# Patient Record
Sex: MALE | Race: WHITE | NOT HISPANIC OR LATINO | Employment: OTHER | URBAN - METROPOLITAN AREA
[De-identification: names, ages, dates, MRNs, and addresses within clinical notes are randomized per-mention and may not be internally consistent; named-entity substitution may affect disease eponyms.]

---

## 2018-02-20 ENCOUNTER — OFFICE VISIT (OUTPATIENT)
Dept: FAMILY MEDICINE CLINIC | Facility: CLINIC | Age: 63
End: 2018-02-20
Payer: COMMERCIAL

## 2018-02-20 VITALS
WEIGHT: 233.4 LBS | HEIGHT: 77 IN | DIASTOLIC BLOOD PRESSURE: 80 MMHG | HEART RATE: 68 BPM | BODY MASS INDEX: 27.56 KG/M2 | SYSTOLIC BLOOD PRESSURE: 150 MMHG

## 2018-02-20 DIAGNOSIS — G43.111 INTRACTABLE MIGRAINE WITH AURA WITH STATUS MIGRAINOSUS: ICD-10-CM

## 2018-02-20 DIAGNOSIS — G43.109 MIGRAINE WITH AURA AND WITHOUT STATUS MIGRAINOSUS, NOT INTRACTABLE: Primary | ICD-10-CM

## 2018-02-20 PROCEDURE — 3008F BODY MASS INDEX DOCD: CPT | Performed by: PHYSICIAN ASSISTANT

## 2018-02-20 PROCEDURE — 99203 OFFICE O/P NEW LOW 30 MIN: CPT | Performed by: PHYSICIAN ASSISTANT

## 2018-02-20 RX ORDER — METHOCARBAMOL 500 MG/1
500 TABLET, FILM COATED ORAL
COMMUNITY
Start: 2016-10-24 | End: 2020-11-17 | Stop reason: ALTCHOICE

## 2018-02-20 RX ORDER — ACETAMINOPHEN AND CODEINE PHOSPHATE 300; 30 MG/1; MG/1
1 TABLET ORAL EVERY 4 HOURS PRN
Qty: 30 TABLET | Refills: 0 | Status: SHIPPED | OUTPATIENT
Start: 2018-02-20 | End: 2018-03-22

## 2018-02-20 RX ORDER — ALPRAZOLAM 0.5 MG/1
0.5 TABLET ORAL EVERY 8 HOURS PRN
Refills: 5 | COMMUNITY
Start: 2018-01-09

## 2018-02-20 RX ORDER — LAMOTRIGINE 100 MG/1
100 TABLET ORAL DAILY
COMMUNITY
Start: 2007-11-05

## 2018-02-20 RX ORDER — AMITRIPTYLINE HYDROCHLORIDE 25 MG/1
25 TABLET, FILM COATED ORAL DAILY
COMMUNITY
Start: 2017-11-28

## 2018-02-20 RX ORDER — RAMIPRIL 10 MG/1
CAPSULE ORAL
COMMUNITY
Start: 2017-11-28

## 2018-02-20 RX ORDER — METOPROLOL SUCCINATE 50 MG/1
50 TABLET, EXTENDED RELEASE ORAL
COMMUNITY
Start: 2007-11-05

## 2018-02-20 RX ORDER — BUPROPION HYDROCHLORIDE 200 MG/1
200 TABLET, EXTENDED RELEASE ORAL 2 TIMES DAILY
COMMUNITY
Start: 2016-07-30

## 2018-02-20 RX ORDER — RISPERIDONE 0.5 MG/1
0.5 TABLET, FILM COATED ORAL
COMMUNITY
Start: 2007-11-05

## 2018-02-20 RX ORDER — PAROXETINE 10 MG/1
TABLET, FILM COATED ORAL
COMMUNITY
Start: 2017-11-28

## 2018-02-20 RX ORDER — VERAPAMIL HYDROCHLORIDE 240 MG/1
TABLET, FILM COATED, EXTENDED RELEASE ORAL
COMMUNITY
Start: 2017-11-28 | End: 2020-09-06 | Stop reason: HOSPADM

## 2018-02-20 RX ORDER — TADALAFIL 5 MG/1
TABLET ORAL
Status: ON HOLD | COMMUNITY
Start: 2016-08-16 | End: 2020-09-05 | Stop reason: CLARIF

## 2018-02-20 NOTE — ASSESSMENT & PLAN NOTE
Patient was informed that we do not usually prescribe all the medications he is currently taking for his bipolar disorder and that he may have to see a psychiatrist in the future  He is currently on Cobra so we may need to prescribe refills until he gets full insurance again  Currently does not need refills  He states he uses Xanax 0 5 twice daily regularly in addition to all of his other medications

## 2018-02-20 NOTE — PATIENT INSTRUCTIONS
Problem List Items Addressed This Visit     Intractable migraine with aura with status migrainosus - Primary     Tylenol #3 given today  Toradol declined  Failed all other medications in the past   [place] pain web site checked and acceptable for only Xanax use from his last family provider           Relevant Medications    aspirin 81 MG tablet    amitriptyline (ELAVIL) 25 mg tablet    buPROPion (WELLBUTRIN SR) 200 MG 12 hr tablet    lamoTRIgine (LaMICtal) 100 mg tablet    methocarbamol (ROBAXIN) 500 mg tablet    metoprolol succinate (TOPROL-XL) 50 mg 24 hr tablet    PARoxetine (PAXIL) 10 mg tablet    verapamil (CALAN-SR) 240 mg CR tablet    acetaminophen-codeine (TYLENOL #3) 300-30 mg per tablet

## 2018-02-20 NOTE — ASSESSMENT & PLAN NOTE
Tylenol #3 given today  Toradol declined  Failed all other medications in the past   [place] pain web site checked and acceptable for only Xanax use from his last family provider

## 2018-02-20 NOTE — PROGRESS NOTES
Assessment/Plan:    Intractable migraine with aura with status migrainosus  Tylenol #3 given today  Toradol declined  Failed all other medications in the past   [place] pain web site checked and acceptable for only Xanax use from his last family provider  Bipolar affective disorder [facility])  Patient was informed that we do not usually prescribe all the medications he is currently taking for his bipolar disorder and that he may have to see a psychiatrist in the future  He is currently on Cobra so we may need to prescribe refills until he gets full insurance again  Currently does not need refills  He states he uses Xanax 0 5 twice daily regularly in addition to all of his other medications  Diagnoses and all orders for this visit:    Migraine with aura and without status migrainosus, not intractable  -     acetaminophen-codeine (TYLENOL #3) 300-30 mg per tablet; Take 1 tablet by mouth every 4 (four) hours as needed for moderate pain for up to 30 days    Intractable migraine with aura with status migrainosus    Other orders  -     ALPRAZolam (XANAX) 0 5 mg tablet; Take 0 5 mg by mouth every 8 (eight) hours as needed  -     aspirin 81 MG tablet; Take 81 mg by mouth  -     amitriptyline (ELAVIL) 25 mg tablet;   -     buPROPion (WELLBUTRIN SR) 200 MG 12 hr tablet;   -     tadalafil (CIALIS) 5 MG tablet; TAKE 1 TABLET BY MOUTH EVERY DAY  -     lamoTRIgine (LaMICtal) 100 mg tablet;   -     methocarbamol (ROBAXIN) 500 mg tablet; Take 500 mg by mouth  -     metoprolol succinate (TOPROL-XL) 50 mg 24 hr tablet; Take 50 mg by mouth  -     PARoxetine (PAXIL) 10 mg tablet;   -     ramipril (ALTACE) 10 MG capsule;   -     risperiDONE (RisperDAL) 0 5 mg tablet; Take 0 5 mg by mouth  -     verapamil (CALAN-SR) 240 mg CR tablet;           Subjective:   CC: Pt  here to establish care  Pt  States he has hx of daily migraines x several years  Pt  States in the last 2 wks they have been worse, Pt   Does note falling 2 wks ago and hitting head  Pt  Would like to discuss pain control options  juliana     Patient ID: Sigifredo Andrade is a 60 y o  male  Patient here as a new patient  He is here mainly for 1 thing which is relief from his current migraine exacerbation  He developed migraines at the age of 53 and they are normally a with him every day at a level of 6 which is usually tolerable however he does have certain times where his headaches will be in the 12 on 1-10 scale and he needs to use something to break the cycle and usually Tylenol No   3 helps  He states he has been to multiple different specialists neurologist acupressure acupuncture he has seen people in [place] for specialists and nothing that they have ever done for him has work  Which have not worked  He has never had relief with Imitrex type products Toradol or caffeine or even pain medication  He had a significant trauma in 2016 that resulted in a subarachnoid hemorrhage and insomnia of the event and he states that during this time he is headaches were completely gone for 2 weeks  He has also done pain management physical therapy He refuses to try Botox because he has had 2 people that he has known who have  as side effects  He states that currently right now he is requesting a prescription for Tylenol No   3 to break his current cycle  He is currently pain for Cobra as he was laid off of work and is looking to technically retire but find a part-time job hopefully  He is currently switching to us from his former primary care physician because this provider did not renew his ARSENIO and is unable to prescribe controlled substances  He does have a family history of severe migraines at start later in life including his father and his mother and aunt and uncle on his father side both killed themselves because there is headaches were very intense and no treatment was found for them    He has also bipolar and he has been getting all of his bipolar medications renewed by his last primary care doctor Marybeth Miranda  His wife knows someone who comes here in sees me and therefore he has chosen to switch practices  The following portions of the patient's history were reviewed and updated as appropriate: allergies, current medications, past family history, past medical history, past social history, past surgical history and problem list     Review of Systems   Constitutional: Negative  HENT: Negative  Eyes: Negative  Respiratory: Negative  Cardiovascular: Negative  Gastrointestinal: Negative  Endocrine: Negative  Genitourinary: Negative  Musculoskeletal: Negative  Skin: Negative  Allergic/Immunologic: Negative  Neurological: Positive for headaches  Hematological: Negative  Psychiatric/Behavioral: Negative  Objective:      Vitals:    02/20/18 1344   BP: 150/80   BP Location: Left arm   Patient Position: Sitting   Pulse: 68   Weight: 106 kg (233 lb 6 4 oz)   Height: 6' 4 5" (1 943 m)            Physical Exam   Constitutional: He is oriented to person, place, and time  He appears well-developed and well-nourished  No distress  HENT:   Head: Normocephalic and atraumatic  Eyes: Conjunctivae are normal  Right eye exhibits no discharge  Left eye exhibits no discharge  Neck: Carotid bruit is not present  Cardiovascular: Normal rate, regular rhythm and normal heart sounds  Exam reveals no gallop and no friction rub  No murmur heard  Pulmonary/Chest: Effort normal and breath sounds normal  No respiratory distress  He has no wheezes  He has no rales  Neurological: He is alert and oriented to person, place, and time  Skin: Skin is warm and dry  He is not diaphoretic  Psychiatric: He has a normal mood and affect  Judgment normal    Nursing note and vitals reviewed

## 2019-10-21 PROBLEM — I25.10 CORONARY ARTERY CALCIFICATION SEEN ON CT SCAN: Status: ACTIVE | Noted: 2019-10-21

## 2019-10-21 PROBLEM — I65.03 VERTEBRAL ARTERY STENOSIS, BILATERAL: Status: ACTIVE | Noted: 2019-10-21

## 2019-10-22 ENCOUNTER — OFFICE VISIT (OUTPATIENT)
Dept: CARDIOLOGY CLINIC | Facility: CLINIC | Age: 64
End: 2019-10-22
Payer: MEDICARE

## 2019-10-22 VITALS
HEIGHT: 76 IN | BODY MASS INDEX: 28.06 KG/M2 | HEART RATE: 61 BPM | SYSTOLIC BLOOD PRESSURE: 160 MMHG | DIASTOLIC BLOOD PRESSURE: 104 MMHG | WEIGHT: 230.4 LBS

## 2019-10-22 DIAGNOSIS — E78.5 DYSLIPIDEMIA: ICD-10-CM

## 2019-10-22 DIAGNOSIS — I10 BENIGN ESSENTIAL HYPERTENSION: ICD-10-CM

## 2019-10-22 DIAGNOSIS — I65.03 VERTEBRAL ARTERY STENOSIS, BILATERAL: ICD-10-CM

## 2019-10-22 DIAGNOSIS — G47.33 OSA (OBSTRUCTIVE SLEEP APNEA): ICD-10-CM

## 2019-10-22 DIAGNOSIS — I25.10 CORONARY ARTERY CALCIFICATION SEEN ON CT SCAN: Primary | ICD-10-CM

## 2019-10-22 PROCEDURE — 93000 ELECTROCARDIOGRAM COMPLETE: CPT | Performed by: INTERNAL MEDICINE

## 2019-10-22 PROCEDURE — 99204 OFFICE O/P NEW MOD 45 MIN: CPT | Performed by: INTERNAL MEDICINE

## 2019-10-22 RX ORDER — TAMSULOSIN HYDROCHLORIDE 0.4 MG/1
0.4 CAPSULE ORAL
COMMUNITY

## 2019-10-22 RX ORDER — MULTIVITAMIN
1 TABLET ORAL DAILY
COMMUNITY

## 2019-10-22 RX ORDER — OMEGA-3 FATTY ACIDS/FISH OIL 300-1000MG
400 CAPSULE ORAL 2 TIMES DAILY
Status: ON HOLD | COMMUNITY
End: 2020-09-05

## 2019-10-22 RX ORDER — MAG HYDROX/ALUMINUM HYD/SIMETH 400-400-40
5000 SUSPENSION, ORAL (FINAL DOSE FORM) ORAL DAILY
COMMUNITY

## 2019-10-22 NOTE — PROGRESS NOTES
Cardiology Consultation     Karen Vazquez  [id number]  1955  [address] [place] [address]      1  Coronary artery calcification seen on CT scan  NM myocardial perfusion spect (rx stress and/or rest)   2  Vertebral artery stenosis, bilateral     3  Benign essential hypertension  POCT ECG   4  Dyslipidemia     5  JAMIE (obstructive sleep apnea)         Discussion/Summary:  Mr Anh Jimenez is a 59-year-old gentleman with no prior cardiac history who presents to the office today for the evaluation of an abnormal CT scan of his chest   This reveals moderate to severe coronary artery calcifications  He does not participate in any formal exercise but with the activity he does perform he denies any cardiopulmonary symptoms of chest pain or shortness of breath  Nonetheless given the findings on CT scan I have asked that he undergo a nuclear stress test for further evaluation  He is already on aspirin and beta-blocker therapy  He recently had his lipids assessed via his primary care provider  I will obtain those results and lipid therapy will be initiated  His blood pressure is high in the office today  However he does have a migraine headache  He checks it at home with acceptable readings  Therefore for now no changes were made to his medication regimen  He is compliant with CPAP for his known history of obstructive sleep apnea  He was also noted to have bilateral vertebral artery stenosis which likely accounts for his symptoms when he turns his head  I have asked that he see a neurologist     He will follow up in the office in a few months for re-evaluation  History of Present Illness:  Mr Anh Jimenez is a 59-year-old gentleman with no prior cardiac history presents to the office today for the evaluation of an abnormal CT scan    In the recent past he has had issues with syncope and near-syncope when he turns his head to the right  He reports that there have been episodes where he has lost complete consciousness because of this  He has woken up disoriented  It tends to happen when he is in a standing position  He also notes that there are times when he turns his head to the right that he feels like he may pass out and has to brace himself  He becomes very flushed  He has ended up falling to his knees without complete loss of consciousness  His last episode was last week  He has a known history of vertigo but denies any sensation of spinning or movement  These episodes are not reminiscent of vertigo  This prompted a CTA of his head and neck revealing significant bilateral vertebral artery stenoses  However he also underwent a CTA of his chest which also revealed moderate to severe coronary artery calcifications  He has no known cardiac history  He is active as part of his job although does not participate in any formal physical activity  With the activity he is able to perform he denies any exertional chest pain or shortness of breath  He denies signs or symptoms of congestive heart failure including increasing lower extremity edema, paroxysmal nocturnal dyspnea, orthopnea, acute weight gain or increasing abdominal girth  He denies palpitations or symptoms of claudication  He has a known history of obstructive sleep apnea  He is compliant with CPAP on a nightly basis      Patient Active Problem List   Diagnosis    JAMIE (obstructive sleep apnea)    Bipolar affective disorder ([facility] [address] )    Closed fracture of multiple ribs of right side    SDH (subdural hematoma) (HCC)    Right frontal lobe punctate hemorrhage (HCC)    Pneumothorax on right    Hypertensive heart disease without heart failure    Intractable migraine with aura with status migrainosus    Coronary artery calcification seen on CT scan    Vertebral artery stenosis, bilateral    Benign essential hypertension    Dyslipidemia     No past medical history on file  Social History     Socioeconomic History    Marital status: Unknown     Spouse name: Not on file    Number of children: Not on file    Years of education: Not on file    Highest education level: Not on file   Occupational History    Not on file   Social Needs    Financial resource strain: Not on file    Food insecurity:     Worry: Not on file     Inability: Not on file    Transportation needs:     Medical: Not on file     Non-medical: Not on file   Tobacco Use    Smoking status: Former Smoker     Last attempt to quit: 1980     Years since quittin 1    Smokeless tobacco: Never Used   Substance and Sexual Activity    Alcohol use: Not on file    Drug use: Not on file    Sexual activity: Not on file   Lifestyle    Physical activity:     Days per week: Not on file     Minutes per session: Not on file    Stress: Not on file   Relationships    Social connections:     Talks on phone: Not on file     Gets together: Not on file     Attends Confucianist service: Not on file     Active member of club or organization: Not on file     Attends meetings of clubs or organizations: Not on file     Relationship status: Not on file    Intimate partner violence:     Fear of current or ex partner: Not on file     Emotionally abused: Not on file     Physically abused: Not on file     Forced sexual activity: Not on file   Other Topics Concern    Not on file   Social History Narrative    Not on file      Family History   Problem Relation Age of Onset    Hypertension Father     Depression Father     Bipolar disorder Father     No Known Problems Mother      No past surgical history on file      Current Outpatient Medications:     ALPRAZolam (XANAX) 0 5 mg tablet, Take 0 5 mg by mouth every 8 (eight) hours as needed, Disp: , Rfl: 5    amitriptyline (ELAVIL) 25 mg tablet, , Disp: , Rfl:     aspirin 81 MG tablet, Take 81 mg by mouth, Disp: , Rfl:     buPROPion (WELLBUTRIN SR) 200 MG 12 hr tablet, , Disp: , Rfl:     Cholecalciferol (VITAMIN D3) 5000 units CAPS, Take 5,000 Units by mouth daily, Disp: , Rfl:     Ibuprofen 200 MG CAPS, Take 200 mg by mouth 2 (two) times a day, Disp: , Rfl:     lamoTRIgine (LaMICtal) 100 mg tablet, Take 100 mg by mouth daily Take 1/2 pill daily, Disp: , Rfl:     metoprolol succinate (TOPROL-XL) 50 mg 24 hr tablet, Take 50 mg by mouth, Disp: , Rfl:     Multiple Vitamin (MULTIVITAMIN) tablet, Take 1 tablet by mouth daily, Disp: , Rfl:     PARoxetine (PAXIL) 10 mg tablet, , Disp: , Rfl:     ramipril (ALTACE) 10 MG capsule, , Disp: , Rfl:     risperiDONE (RisperDAL) 0 5 mg tablet, Take 0 5 mg by mouth, Disp: , Rfl:     tamsulosin (FLOMAX) 0 4 mg, Take 0 4 mg by mouth daily with dinner, Disp: , Rfl:     verapamil (CALAN-SR) 240 mg CR tablet, , Disp: , Rfl:     methocarbamol (ROBAXIN) 500 mg tablet, Take 500 mg by mouth, Disp: , Rfl:     tadalafil (CIALIS) 5 MG tablet, TAKE 1 TABLET BY MOUTH EVERY DAY, Disp: , Rfl:   Allergies   Allergen Reactions    Prednisone Anaphylaxis     Other reaction(s): Other (See Comments)    Ketorolac Other (See Comments)     rage         Labs:  No visits with results within 2 Month(s) from this visit  Latest known visit with results is:   No results found for any previous visit  Imaging: No results found  ECG:  Normal sinus rhythm, normal ECG    Review of Systems:  Review of Systems   Respiratory: Negative for cough, choking, chest tightness and shortness of breath  Cardiovascular: Negative for chest pain, palpitations and leg swelling  All other systems reviewed and are negative          Vitals:    10/22/19 1258   BP: (!) 160/104   BP Location: Right arm   Patient Position: Sitting   Cuff Size: Large   Pulse: 61   Weight: 105 kg (230 lb 6 4 oz)   Height: 6' 4" (1 93 m)     Vitals:    10/22/19 1258   Weight: 105 kg (230 lb 6 4 oz)     Height: 6' 4" (193 cm)     Physical Exam:  General appearance:  Appears stated age, alert, well appearing and in no distress  HEENT:  PERRLA, EOMI, no scleral icterus, no conjunctival pallor  NECK:  Supple, No elevated JVP, no thyromegaly, no carotid bruits  HEART:  Regular rate and rhythm, normal S1/S2, no S3/S4, no murmur or rub  LUNGS:  Clear to auscultation bilaterally  ABDOMEN:  Soft, non-tender, positive bowel sounds, no rebound or guarding, no organomegaly   EXTREMITIES:  No edema  VASCULAR:  Normal pedal pulses   SKIN: No lesions or rashes on exposed skin  NEURO:  CN II-XII intact, no focal deficits

## 2019-10-24 ENCOUNTER — TELEPHONE (OUTPATIENT)
Dept: CARDIOLOGY CLINIC | Facility: CLINIC | Age: 64
End: 2019-10-24

## 2019-10-24 DIAGNOSIS — I25.10 CORONARY ARTERY CALCIFICATION SEEN ON CT SCAN: Primary | ICD-10-CM

## 2019-10-24 RX ORDER — ROSUVASTATIN CALCIUM 40 MG/1
40 TABLET, COATED ORAL DAILY
Qty: 30 TABLET | Refills: 11 | Status: SHIPPED | OUTPATIENT
Start: 2019-10-24 | End: 2020-05-24 | Stop reason: SDUPTHER

## 2019-10-24 NOTE — TELEPHONE ENCOUNTER
Calls placed to [facility], requesting Disc of recent CTA of the head neck and chest   Waiting on return call   [facility] --[phone]

## 2019-10-29 ENCOUNTER — TELEPHONE (OUTPATIENT)
Dept: CARDIOLOGY CLINIC | Facility: CLINIC | Age: 64
End: 2019-10-29

## 2019-10-29 NOTE — TELEPHONE ENCOUNTER
Received patient's disc in the Neurosurgery mailbox  VALORIE Astorga uploaded images to PACs  Called Chayito at [place] [address] [facility] and informed her of this  Sent discs to the Cardiology office via interdepartment mail  Jackie Gonzalez is aware

## 2019-11-08 ENCOUNTER — TELEPHONE (OUTPATIENT)
Dept: NEUROLOGY | Facility: CLINIC | Age: 64
End: 2019-11-08

## 2019-11-08 ENCOUNTER — DOCUMENTATION (OUTPATIENT)
Dept: CARDIOLOGY CLINIC | Facility: CLINIC | Age: 64
End: 2019-11-08

## 2019-11-08 ENCOUNTER — TELEPHONE (OUTPATIENT)
Dept: CARDIOLOGY CLINIC | Facility: CLINIC | Age: 64
End: 2019-11-08

## 2019-11-08 NOTE — TELEPHONE ENCOUNTER
Pt called left message to reach out to Dr Rayna Marie to see if she has been in touch with the neurologist   Please advise thanks  Patient called told would put a message into Dr Rayna Marie

## 2019-11-08 NOTE — TELEPHONE ENCOUNTER
Called neurology office they telling me first new patient appt is not til Feb 26 with Dr John Barclay  Please advise Also please place referral in computer for neurology  I will gladly call back just need direction

## 2019-11-08 NOTE — TELEPHONE ENCOUNTER
Thanks  I talked to Dr Brittany Rodarte and hopefully they will get him in before hand  Appreciate your help        Jack Marrow

## 2019-11-08 NOTE — TELEPHONE ENCOUNTER
I sent a message to Dr Joan Vazquez and have yet to hear back from him  If you could please call their office and see when his first available appointment is     his films from [facility] are uploaded to PACs and I am referring him for vertebral artery stenosis  If its not in the next few weeks I can reach out to Dr Joan Vazquez again       Thanks     Rebecca Garcia

## 2019-11-19 ENCOUNTER — HOSPITAL ENCOUNTER (OUTPATIENT)
Dept: NON INVASIVE DIAGNOSTICS | Facility: HOSPITAL | Age: 64
Discharge: HOME | End: 2019-11-19
Attending: INTERNAL MEDICINE
Payer: MEDICARE

## 2019-11-19 ENCOUNTER — HOSPITAL ENCOUNTER (OUTPATIENT)
Dept: NUCLEAR MEDICINE | Facility: HOSPITAL | Age: 64
Discharge: HOME | End: 2019-11-19
Attending: INTERNAL MEDICINE
Payer: MEDICARE

## 2019-11-19 ENCOUNTER — TELEPHONE (OUTPATIENT)
Dept: NEUROLOGY | Facility: CLINIC | Age: 64
End: 2019-11-19

## 2019-11-19 DIAGNOSIS — I25.10 CORONARY ARTERY CALCIFICATION SEEN ON CT SCAN: ICD-10-CM

## 2019-11-19 LAB
ARRHY DURING EX: NORMAL
CHEST PAIN STATEMENT: NORMAL
MAX DIASTOLIC BP: 78 MMHG
MAX HEART RATE: 75 BPM
MAX PREDICTED HEART RATE: 155 BPM
MAX. SYSTOLIC BP: 126 MMHG
PROTOCOL NAME: NORMAL
REASON FOR TERMINATION: NORMAL
TARGET HR FORMULA: NORMAL
TEST INDICATION: NORMAL
TIME IN EXERCISE PHASE: NORMAL

## 2019-11-19 PROCEDURE — 78452 HT MUSCLE IMAGE SPECT MULT: CPT

## 2019-11-19 PROCEDURE — 93017 CV STRESS TEST TRACING ONLY: CPT

## 2019-11-19 PROCEDURE — A9502 TC99M TETROFOSMIN: HCPCS

## 2019-11-19 PROCEDURE — 93018 CV STRESS TEST I&R ONLY: CPT

## 2019-11-19 PROCEDURE — 93016 CV STRESS TEST SUPVJ ONLY: CPT

## 2019-11-19 RX ADMIN — REGADENOSON 0.4 MG: 0.08 INJECTION, SOLUTION INTRAVENOUS at 09:32

## 2019-11-19 NOTE — TELEPHONE ENCOUNTER
Patient returned my phone call, scheduled the patient for 8 am on 11/27 in [place]  Verbally went over the address to [place] over the phone

## 2019-11-19 NOTE — TELEPHONE ENCOUNTER
Called patient to schedule him for an appt with Dr Joan Vazquez in [place] on 11/27 at either 8 am or 8:30 per Dr Joan Vazquez  [facility]    If patient calls back please transfer the pt to me

## 2019-11-25 ENCOUNTER — TELEPHONE (OUTPATIENT)
Dept: NEUROLOGY | Facility: CLINIC | Age: 64
End: 2019-11-25

## 2019-11-27 ENCOUNTER — OFFICE VISIT (OUTPATIENT)
Dept: NEUROLOGY | Facility: CLINIC | Age: 64
End: 2019-11-27
Payer: MEDICARE

## 2019-11-27 VITALS
SYSTOLIC BLOOD PRESSURE: 103 MMHG | DIASTOLIC BLOOD PRESSURE: 51 MMHG | BODY MASS INDEX: 29.1 KG/M2 | WEIGHT: 239 LBS | HEART RATE: 65 BPM | HEIGHT: 76 IN

## 2019-11-27 DIAGNOSIS — M54.2 NECK PAIN: ICD-10-CM

## 2019-11-27 DIAGNOSIS — R42 POSTURAL DIZZINESS WITH PRESYNCOPE: ICD-10-CM

## 2019-11-27 DIAGNOSIS — R55 POSTURAL DIZZINESS WITH PRESYNCOPE: ICD-10-CM

## 2019-11-27 DIAGNOSIS — I65.03 VERTEBRAL ARTERY STENOSIS, BILATERAL: ICD-10-CM

## 2019-11-27 DIAGNOSIS — G43.111 INTRACTABLE MIGRAINE WITH AURA WITH STATUS MIGRAINOSUS: Primary | ICD-10-CM

## 2019-11-27 PROCEDURE — 99205 OFFICE O/P NEW HI 60 MIN: CPT | Performed by: PSYCHIATRY & NEUROLOGY

## 2019-11-27 RX ORDER — NIACIN 500 MG
500 TABLET ORAL 2 TIMES DAILY WITH MEALS
COMMUNITY

## 2019-11-27 NOTE — PATIENT INSTRUCTIONS
Episodic altered sensorium with presyncope: Anaid Forbes presents for an initial consultation with regard to multiple episodes of altered sensorium as described in the HPI  The episodes are stereotyped and tend to be triggered when he is active and positionally with head turn and/ or tilt to the right  They begin with a sense of warmth that radiates up the back of the head and down into the chest   Ultimately this sense of warmth is most consistent with the nerve pinch  I personally reviewed his CT angiogram and his region of stenosis in the right vertebral artery is at the origin and then in the V4 intracranial segment  These segments are not typically torque to with head turning, and considering that this does not occur her with the vast majority of episodes of head turning to the right, and never occurs when he is laying, I would suggest that vertebrobasilar insufficiency is less likely  At this point his episodes seem most consistent with presyncopal episodes related to nerve pinching in the neck  He does have a history of multiple concussions, as result he does have a higher than usual risk for seizure  That having been said he takes lamotrigine for mood stabilization but that should help to decrease his overall risk of having a seizure event  - as his neurologic exam is quite normal and symmetric I do not think he needs an MRI of the cervical spine at this point   -he does have constant pain in the right neck as well as this pinched nerve sensation  He would clearly benefit from a course of physical therapy to help loosen his neck and to improve his overall range of motion  This will help to decrease inflammation potentially and we organized / real line the soft tissues in his neck  He also occasionally engages in cold stone massage for migraine, this would not be unreasonable in that region as well    His physical therapy regimen should include transcutaneous electrical nerve stimulation     - when he is working up over his head it would not be unreasonable to consider wearing a soft foam cervical collar to determine if that helps to decrease the episodes by limiting his neck range of motion to a degree while he is working on this   -I do think that a 1 time EEG with hyperventilation would be reasonable for him and I will request that to be performed   - I will touch base with his cardiologist in regard to whether not he should wear Holter monitor for a few weeks  Considering that he averages 1 episode a week and frequently more, I have high hopes that we would be able to capture 1  Ultimately I do not think that these events have a primary cardiac cause but episodic bradycardia associated with the events would provide additional evidence that these are vagal responses to the nerve pressure in the neck  - with regard to his chronic migraines, we will continue to address this when he returns to the office but initially we would like to get these episodes stabilized and prevented if at all possible  I will plan for him to return to the office to see us in 4 months time but I would be happy to see him sooner if the need should arise  In the interim I would like for him to contact me in no more than 4 weeks after starting his physical therapy to let me know how he is doing with the events, and whether not he has found a cervical collar ( if he chooses to trial it) to be of any benefit  I do not think he requires a conventional angiogram at this point in time however if we find that these initial conservative measures are ineffective we may consider a conventional angiogram with dynamic positioning as well as an MRI of the cervical spine

## 2019-11-27 NOTE — PROGRESS NOTES
Patient ID: Keyshawn Matrinez is a 71 y o  male  Assessment/Plan:    No problem-specific Assessment & Plan notes found for this encounter  Diagnoses and all orders for this visit:    Intractable migraine with aura with status migrainosus    Vertebral artery stenosis, bilateral    Postural dizziness with presyncope    Other orders  -     niacin 500 mg tablet; Take 500 mg by mouth 2 (two) times a day with meals         Patient Instructions   Episodic altered sensorium with presyncope: Francesco Pearson presents for an initial consultation with regard to multiple episodes of altered sensorium as described in the HPI  The episodes are stereotyped and tend to be triggered when he is active and positionally with head turn and/ or tilt to the right  They begin with a sense of warmth that radiates up the back of the head and down into the chest   Ultimately this sense of warmth is most consistent with the nerve pinch  I personally reviewed his CT angiogram and his region of stenosis in the right vertebral artery is at the origin and then in the V4 intracranial segment  These segments are not typically torque to with head turning, and considering that this does not occur her with the vast majority of episodes of head turning to the right, and never occurs when he is laying, I would suggest that vertebrobasilar insufficiency is less likely  At this point his episodes seem most consistent with presyncopal episodes related to nerve pinching in the neck  He does have a history of multiple concussions, as result he does have a higher than usual risk for seizure  That having been said he takes lamotrigine for mood stabilization but that should help to decrease his overall risk of having a seizure event  - as his neurologic exam is quite normal and symmetric I do not think he needs an MRI of the cervical spine at this point   -he does have constant pain in the right neck as well as this pinched nerve sensation    He would clearly benefit from a course of physical therapy to help loosen his neck and to improve his overall range of motion  This will help to decrease inflammation potentially and we organized / real line the soft tissues in his neck  He also occasionally engages in cold stone massage for migraine, this would not be unreasonable in that region as well  His physical therapy regimen should include transcutaneous electrical nerve stimulation     - when he is working up over his head it would not be unreasonable to consider wearing a soft foam cervical collar to determine if that helps to decrease the episodes by limiting his neck range of motion to a degree while he is working on this   -I do think that a 1 time EEG with hyperventilation would be reasonable for him and I will request that to be performed   - I will touch base with his cardiologist in regard to whether not he should wear Holter monitor for a few weeks  Considering that he averages 1 episode a week and frequently more, I have high hopes that we would be able to capture 1  Ultimately I do not think that these events have a primary cardiac cause but episodic bradycardia associated with the events would provide additional evidence that these are vagal responses to the nerve pressure in the neck  - with regard to his chronic migraines, we will continue to address this when he returns to the office but initially we would like to get these episodes stabilized and prevented if at all possible  I will plan for him to return to the office to see us in 4 months time but I would be happy to see him sooner if the need should arise  In the interim I would like for him to contact me in no more than 4 weeks after starting his physical therapy to let me know how he is doing with the events, and whether not he has found a cervical collar ( if he chooses to trial it) to be of any benefit    I do not think he requires a conventional angiogram at this point in time however if we find that these initial conservative measures are ineffective we may consider a conventional angiogram with dynamic positioning as well as an MRI of the cervical spine  Subjective:    HPI      Una Cooley presents for an initial consultation with regard to episodes of altered sensorium  He describes that this began in the spring of 2019 about 1 week after he sustained a fall with a head injury  He exhibits stereotyped episodes which only occur when he is upright and physically active, never when he is just standing  Perhaps somewhat more often when he is working over his head as opposed to directly in front of him ( 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)  The episodes always begin with a sense of warmth spreading up the right  Posterior neck  He then developed a sense of warmth in the chest and almost a feeling as though he may start sweating  Just prior to that he also feels like the room may begin spinning although it is not frankly spinning ( he does have a history of vertigo )  Subsequently he will experience a sense of generalized weakness and loss of balance /control requiring him to go down  Frequently he will dropped to his knees warm perhaps even all the way to the floor  The prelude to the episode lasts for about 5 seconds and then he may be down for up to a minute prior to standing up  Notably he only loses consciousness approximately  Once in every 10 episodes and only extremely briefly  He notes that the episodes seem to have to go all the way through, he does not typically get partial events  They are stereotyped  He does not have a history of seizure that we know of and takes lamotrigine for mood stabilization however he has sustained 11 concussions including several of them that were very significant and has had at least 1 small intercerebral hemorrhage based on his history        I personally reviewed his CT angiogram and agree that there is stenosis in the right vertebral artery at the origin as well as in the V4 segment which is intracranial   The left vertebral artery is open and patent and the vertebral arteries appear to be codominant  Based on these observations, I would suggest that vertebrobasilar insufficiency is less likely as those areas of the vertebral artery are unlikely to torque with head turning, and he frequently will turn his head to the right and tilt his head to the right while working without having episodes  His symptoms seem most consistent with episodic nerve pinching causing nerve pain and leading to a vagal response  I would also consider seizure to be possible but less likely  History reviewed  No pertinent past medical history      Social History     Socioeconomic History    Marital status: Unknown     Spouse name: None    Number of children: None    Years of education: None    Highest education level: None   Occupational History    None   Social Needs    Financial resource strain: None    Food insecurity:     Worry: None     Inability: None    Transportation needs:     Medical: None     Non-medical: None   Tobacco Use    Smoking status: Former Smoker     Last attempt to quit: 1980     Years since quittin 2    Smokeless tobacco: Never Used   Substance and Sexual Activity    Alcohol use: None    Drug use: None    Sexual activity: None   Lifestyle    Physical activity:     Days per week: None     Minutes per session: None    Stress: None   Relationships    Social connections:     Talks on phone: None     Gets together: None     Attends Adventism service: None     Active member of club or organization: None     Attends meetings of clubs or organizations: None     Relationship status: None    Intimate partner violence:     Fear of current or ex partner: None     Emotionally abused: None     Physically abused: None     Forced sexual activity: None   Other Topics Concern    None Social History Narrative    None       Family History   Problem Relation Age of Onset    Hypertension Father     Depression Father     Bipolar disorder Father     No Known Problems Mother          Current Outpatient Medications:     ALPRAZolam (XANAX) 0 5 mg tablet, Take 0 5 mg by mouth every 8 (eight) hours as needed, Disp: , Rfl: 5    amitriptyline (ELAVIL) 25 mg tablet, Take 25 mg by mouth daily , Disp: , Rfl:     buPROPion (WELLBUTRIN SR) 200 MG 12 hr tablet, Take 200 mg by mouth 2 (two) times a day , Disp: , Rfl:     Cholecalciferol (VITAMIN D3) 5000 units CAPS, Take 5,000 Units by mouth daily, Disp: , Rfl:     Ibuprofen 200 MG CAPS, Take 400 mg by mouth 2 (two) times a day , Disp: , Rfl:     lamoTRIgine (LaMICtal) 100 mg tablet, Take 100 mg by mouth daily Take 1/2 pill daily, Disp: , Rfl:     metoprolol succinate (TOPROL-XL) 50 mg 24 hr tablet, Take 50 mg by mouth, Disp: , Rfl:     Multiple Vitamin (MULTIVITAMIN) tablet, Take 1 tablet by mouth daily, Disp: , Rfl:     niacin 500 mg tablet, Take 500 mg by mouth 2 (two) times a day with meals, Disp: , Rfl:     PARoxetine (PAXIL) 10 mg tablet, , Disp: , Rfl:     ramipril (ALTACE) 10 MG capsule, , Disp: , Rfl:     risperiDONE (RisperDAL) 0 5 mg tablet, Take 0 5 mg by mouth, Disp: , Rfl:     rosuvastatin (CRESTOR) 40 MG tablet, Take 1 tablet (40 mg total) by mouth daily, Disp: 30 tablet, Rfl: 11    tadalafil (CIALIS) 5 MG tablet, TAKE 1 TABLET BY MOUTH EVERY DAY, Disp: , Rfl:     tamsulosin (FLOMAX) 0 4 mg, Take 0 4 mg by mouth daily with dinner, Disp: , Rfl:     verapamil (CALAN-SR) 240 mg CR tablet, , Disp: , Rfl:     aspirin 81 MG tablet, Take 81 mg by mouth, Disp: , Rfl:     methocarbamol (ROBAXIN) 500 mg tablet, Take 500 mg by mouth, Disp: , Rfl:     The following portions of the patient's history were reviewed: allergies, current medications, past family history, past medical history, past social history and problem list  Objective:    Height 6' 4" (1 93 m), weight 108 kg (239 lb)  /51   Pulse 65   Ht 6' 4" (1 93 m)   Wt 108 kg (239 lb)   BMI 29 09 kg/m²       Physical Exam    Neurological Exam      At the time of my evaluation he was awake, alert, and in no distress  Cranial nerves 2-12 were symmetrically intact bilaterally  There were no obvious significant limitations to neck range of motion  Motor testing reveals 5/5 strength in the bilateral upper lower extremities with the exception of the left deltoid which was significantly limited by pain/ arthritis  There was no drift  Finger-to-nose testing was intact with eyes open and closed with no loss of proprioception or ataxia  Sensation was intact to temperature and vibration in the bilateral upper extremities as well as pinprick  Sensation was intact to light touch in the bilateral lower extremities  Pulses and capillary refill were symmetric in the distal bilateral upper extremities  Deep tendon reflexes were suppressed in the bilateral patella and biceps symmetrically throughout  His gait was  Stable  On cardiovascular exam he exhibited regular rate and rhythm with no carotid bruits or vertebral artery bruits  His lungs were clear to auscultation bilaterally  ROS:    Review of Systems   Constitutional: Negative  Negative for appetite change and fever  HENT: Negative  Negative for hearing loss, tinnitus, trouble swallowing and voice change  Eyes: Positive for visual disturbance  Negative for photophobia and pain  Respiratory: Negative  Negative for shortness of breath  Cardiovascular: Negative  Negative for palpitations  Gastrointestinal: Negative  Negative for nausea and vomiting  Endocrine: Negative  Negative for cold intolerance and heat intolerance  Genitourinary: Negative  Negative for dysuria, frequency and urgency  Musculoskeletal: Positive for arthralgias, back pain, myalgias and neck pain  Falls   Skin: Negative  Negative for rash  Neurological: Positive for syncope and headaches  Negative for dizziness, tremors, seizures, facial asymmetry, speech difficulty, weakness, light-headedness and numbness  Twitching   Hematological: Negative  Does not bruise/bleed easily  Psychiatric/Behavioral: Negative  Negative for confusion, hallucinations and sleep disturbance  Reviewed ROS as entered by medical assistant

## 2019-12-04 ENCOUNTER — HOSPITAL ENCOUNTER (OUTPATIENT)
Dept: NEUROLOGY | Facility: CLINIC | Age: 64
Discharge: HOME | End: 2019-12-04
Payer: MEDICARE

## 2019-12-04 DIAGNOSIS — R55 POSTURAL DIZZINESS WITH PRESYNCOPE: ICD-10-CM

## 2019-12-04 DIAGNOSIS — R42 POSTURAL DIZZINESS WITH PRESYNCOPE: ICD-10-CM

## 2019-12-04 DIAGNOSIS — G43.111 INTRACTABLE MIGRAINE WITH AURA WITH STATUS MIGRAINOSUS: ICD-10-CM

## 2019-12-04 PROCEDURE — 95819 EEG AWAKE AND ASLEEP: CPT

## 2019-12-05 PROCEDURE — 95816 EEG AWAKE AND DROWSY: CPT | Performed by: PSYCHIATRY & NEUROLOGY

## 2019-12-09 NOTE — RESULT ENCOUNTER NOTE
Please will you be sure that Vicki Hewitt knows that his EEG is fine with no clear evidence of seizure so far

## 2019-12-10 ENCOUNTER — TELEPHONE (OUTPATIENT)
Dept: NEUROLOGY | Facility: CLINIC | Age: 64
End: 2019-12-10

## 2020-01-27 ENCOUNTER — OFFICE VISIT (OUTPATIENT)
Dept: CARDIOLOGY CLINIC | Facility: CLINIC | Age: 65
End: 2020-01-27
Payer: MEDICARE

## 2020-01-27 VITALS
WEIGHT: 239 LBS | HEART RATE: 68 BPM | BODY MASS INDEX: 29.1 KG/M2 | DIASTOLIC BLOOD PRESSURE: 72 MMHG | SYSTOLIC BLOOD PRESSURE: 108 MMHG | HEIGHT: 76 IN

## 2020-01-27 DIAGNOSIS — I10 BENIGN ESSENTIAL HYPERTENSION: ICD-10-CM

## 2020-01-27 DIAGNOSIS — G47.33 OSA (OBSTRUCTIVE SLEEP APNEA): ICD-10-CM

## 2020-01-27 DIAGNOSIS — I25.10 CORONARY ARTERY CALCIFICATION SEEN ON CT SCAN: Primary | ICD-10-CM

## 2020-01-27 DIAGNOSIS — I65.03 VERTEBRAL ARTERY STENOSIS, BILATERAL: ICD-10-CM

## 2020-01-27 DIAGNOSIS — E78.5 DYSLIPIDEMIA: ICD-10-CM

## 2020-01-27 PROCEDURE — 99214 OFFICE O/P EST MOD 30 MIN: CPT | Performed by: INTERNAL MEDICINE

## 2020-01-27 NOTE — PROGRESS NOTES
Cardiology Follow-up    Edenilson Cmaryn  [id number]  1955  [address] [place]  [place] [id number]      1  Coronary artery calcification seen on CT scan     2  Benign essential hypertension     3  Dyslipidemia  Lipid Panel with Direct LDL reflex    Comprehensive metabolic panel   4  JAMIE (obstructive sleep apnea)     5  Vertebral artery stenosis, bilateral         Discussion/Summary:  Mr Naseem Girard is a 21-year-old gentleman who presents to the office today for routine follow-up  Since his last visit he has been feeling well  He has had no recurrent near-syncopal or syncopal episodes  His blood pressure is well controlled in the office today  It was well controlled when he saw his neurologist   No changes were made to his antihypertensive medication regimen  He remains compliant with CPAP  After his last visit due to coronary artery calcifications noted on CT scan he was placed on rosuvastatin  He is tolerating this without difficulty  I have given him a prescription to have his lipids and LFTs reassessed  He also underwent at nuclear stress test which was unremarkable  No further testing is advised  He will follow-up in the office in six months for re-evaluation  History of Present Illness:  Mr Naseem Girard is a 21-year-old gentleman with no prior cardiac history presents to the office today for routine follow-up  After his last visit he was evaluated by neurology  It was felt that the symptoms were less likely secondary to vertebrobasilar insufficiency but probably related to a pinched nerve in his neck  He also underwent a nuclear stress test given coronary artery calcifications noted on CT scan  He has not had any recurrent symptoms since about two weeks after his visit with me  He remains active as part of his job although does not participate in any formal physical activity    With the activity he is able to perform he denies any exertional chest pain or shortness of breath  He denies signs or symptoms of congestive heart failure including increasing lower extremity edema, paroxysmal nocturnal dyspnea, orthopnea, acute weight gain or increasing abdominal girth  He denies palpitations or symptoms of claudication  He remains compliant with CPAP on a nightly basis      Patient Active Problem List   Diagnosis    JAMIE (obstructive sleep apnea)    Bipolar affective disorder ([facility] [address] )    Closed fracture of multiple ribs of right side    SDH (subdural hematoma) (HCC)    Right frontal lobe punctate hemorrhage (HCC)    Pneumothorax on right    Hypertensive heart disease without heart failure    Intractable migraine with aura with status migrainosus    Coronary artery calcification seen on CT scan    Vertebral artery stenosis, bilateral    Benign essential hypertension    Dyslipidemia    Postural dizziness with presyncope    Neck pain     Past Medical History:   Diagnosis Date    Anxiety     Arthritis     Depression     Hyperlipidemia     Hypertension     Migraine      Social History     Socioeconomic History    Marital status: Unknown     Spouse name: Not on file    Number of children: Not on file    Years of education: Not on file    Highest education level: Not on file   Occupational History    Not on file   Social Needs    Financial resource strain: Not on file    Food insecurity:     Worry: Not on file     Inability: Not on file    Transportation needs:     Medical: Not on file     Non-medical: Not on file   Tobacco Use    Smoking status: Former Smoker     Last attempt to quit: 1980     Years since quittin 4    Smokeless tobacco: Never Used   Substance and Sexual Activity    Alcohol use: Not on file    Drug use: Not on file    Sexual activity: Not on file   Lifestyle    Physical activity:     Days per week: Not on file     Minutes per session: Not on file    Stress: Not on file   Relationships    Social connections:     Talks on phone: Not on file     Gets together: Not on file     Attends Buddhism service: Not on file     Active member of club or organization: Not on file     Attends meetings of clubs or organizations: Not on file     Relationship status: Not on file    Intimate partner violence:     Fear of current or ex partner: Not on file     Emotionally abused: Not on file     Physically abused: Not on file     Forced sexual activity: Not on file   Other Topics Concern    Not on file   Social History Narrative    Not on file      Family History   Problem Relation Age of Onset    Hypertension Father     Depression Father     Bipolar disorder Father     Heart disease Mother     Heart disease Maternal Grandmother     Heart disease Maternal Grandfather      Past Surgical History:   Procedure Laterality Date    APPENDECTOMY  1990       Current Outpatient Medications:     ALPRAZolam (XANAX) 0 5 mg tablet, Take 0 5 mg by mouth every 8 (eight) hours as needed, Disp: , Rfl: 5    amitriptyline (ELAVIL) 25 mg tablet, Take 25 mg by mouth daily , Disp: , Rfl:     aspirin 81 MG tablet, Take 81 mg by mouth, Disp: , Rfl:     buPROPion (WELLBUTRIN SR) 200 MG 12 hr tablet, Take 200 mg by mouth 2 (two) times a day , Disp: , Rfl:     Cholecalciferol (VITAMIN D3) 5000 units CAPS, Take 5,000 Units by mouth daily, Disp: , Rfl:     Ibuprofen 200 MG CAPS, Take 400 mg by mouth 2 (two) times a day , Disp: , Rfl:     lamoTRIgine (LaMICtal) 100 mg tablet, Take 100 mg by mouth daily Take 1/2 pill daily, Disp: , Rfl:     methocarbamol (ROBAXIN) 500 mg tablet, Take 500 mg by mouth, Disp: , Rfl:     metoprolol succinate (TOPROL-XL) 50 mg 24 hr tablet, Take 50 mg by mouth, Disp: , Rfl:     Multiple Vitamin (MULTIVITAMIN) tablet, Take 1 tablet by mouth daily, Disp: , Rfl:     niacin 500 mg tablet, Take 500 mg by mouth 2 (two) times a day with meals, Disp: , Rfl:     PARoxetine (PAXIL) 10 mg tablet, , Disp: , Rfl:     ramipril (ALTACE) 10 MG capsule, , Disp: , Rfl:     risperiDONE (RisperDAL) 0 5 mg tablet, Take 0 5 mg by mouth, Disp: , Rfl:     rosuvastatin (CRESTOR) 40 MG tablet, Take 1 tablet (40 mg total) by mouth daily, Disp: 30 tablet, Rfl: 11    tadalafil (CIALIS) 5 MG tablet, TAKE 1 TABLET BY MOUTH EVERY DAY, Disp: , Rfl:     tamsulosin (FLOMAX) 0 4 mg, Take 0 4 mg by mouth daily with dinner, Disp: , Rfl:     verapamil (CALAN-SR) 240 mg CR tablet, , Disp: , Rfl:   Allergies   Allergen Reactions    Prednisone Anaphylaxis     Other reaction(s): Other (See Comments)    Ketorolac Other (See Comments)     rage         Labs:  No visits with results within 2 Month(s) from this visit  Latest known visit with results is:   Hospital Outpatient Visit on 11/19/2019   Component Date Value    Protocol Name 11/19/2019 [facility]     Time In Exercise Phase 11/19/2019 00:03:00     MAX  SYSTOLIC BP 88/95/7748 444     Max Diastolic Bp 33/96/2593 78     Max Heart Rate 11/19/2019 75     Max Predicted Heart Rate 11/19/2019 155     Reason for Termination 11/19/2019 Infusion Complete     Test Indication 11/19/2019 Screening for CAD     Target Hr Formular 11/19/2019 (220 - Age)*85%     Arrhy During Ex 11/19/2019 ventricular premature beats-isolated     Chest Pain Statement 11/19/2019 none         Imaging: No results found  ECG:  Normal sinus rhythm, normal ECG    Review of Systems:  Review of Systems   Respiratory: Negative for cough, choking, chest tightness and shortness of breath  Cardiovascular: Negative for chest pain, palpitations and leg swelling  All other systems reviewed and are negative          Vitals:    01/27/20 0838 01/27/20 0858   BP: 140/84 108/72   BP Location: Left arm    Patient Position: Sitting    Cuff Size: Large    Pulse: 68    Weight: 108 kg (239 lb)    Height: 6' 4" (1 93 m)      Vitals:    01/27/20 0838   Weight: 108 kg (239 lb)     Height: 6' 4" (193 cm) Physical Exam:  General:  Alert and cooperative, appears stated age  HEENT:  PERRLA, EOMI, no scleral icterus, no conjunctival pallor  Neck:  No lymphadenopathy, no thyromegaly, no carotid bruits, no elevated JVP  Heart:  Regular rate and rhythm, normal S1/S2, no S3/S4, no murmur  Lungs:  Clear to auscultation bilaterally   Abdomen:  Soft, non-tender, positive bowel sounds, no rebound or guarding,   no organomegaly   Extremities:  No clubbing, cyanosis or edema   Vascular:  2+ pedal pulses  Skin:  No rashes or lesions on exposed skin  Neurologic:  Cranial nerves II-XII grossly intact without focal deficits

## 2020-04-14 ENCOUNTER — TELEPHONE (OUTPATIENT)
Dept: NEUROLOGY | Facility: CLINIC | Age: 65
End: 2020-04-14

## 2020-05-20 ENCOUNTER — TELEPHONE (OUTPATIENT)
Dept: NEUROLOGY | Facility: CLINIC | Age: 65
End: 2020-05-20

## 2020-05-23 DIAGNOSIS — I25.10 CORONARY ARTERY CALCIFICATION SEEN ON CT SCAN: ICD-10-CM

## 2020-05-24 RX ORDER — ROSUVASTATIN CALCIUM 40 MG/1
TABLET, COATED ORAL
Qty: 90 TABLET | Refills: 3 | Status: SHIPPED | OUTPATIENT
Start: 2020-05-24 | End: 2021-08-21 | Stop reason: SDUPTHER

## 2020-09-05 ENCOUNTER — APPOINTMENT (EMERGENCY)
Dept: RADIOLOGY | Facility: HOSPITAL | Age: 65
End: 2020-09-05
Payer: MEDICARE

## 2020-09-05 ENCOUNTER — HOSPITAL ENCOUNTER (OUTPATIENT)
Facility: HOSPITAL | Age: 65
Setting detail: OBSERVATION
Discharge: HOME | End: 2020-09-06
Attending: EMERGENCY MEDICINE | Admitting: INTERNAL MEDICINE
Payer: MEDICARE

## 2020-09-05 DIAGNOSIS — N28.9 RENAL INSUFFICIENCY: ICD-10-CM

## 2020-09-05 DIAGNOSIS — R07.9 CHEST PAIN: ICD-10-CM

## 2020-09-05 DIAGNOSIS — E78.5 HYPERLIPIDEMIA, UNSPECIFIED HYPERLIPIDEMIA TYPE: ICD-10-CM

## 2020-09-05 DIAGNOSIS — R00.1 BRADYCARDIA: Primary | ICD-10-CM

## 2020-09-05 PROBLEM — R55 NEAR SYNCOPE: Status: ACTIVE | Noted: 2020-09-05

## 2020-09-05 PROBLEM — N18.30 STAGE 3 CHRONIC KIDNEY DISEASE (HCC): Status: ACTIVE | Noted: 2020-09-05

## 2020-09-05 PROBLEM — R07.89 CHEST PRESSURE: Status: ACTIVE | Noted: 2020-09-05

## 2020-09-05 PROBLEM — R07.89 CHEST TIGHTNESS: Status: ACTIVE | Noted: 2020-09-05

## 2020-09-05 LAB
ANION GAP SERPL CALCULATED.3IONS-SCNC: 5 MMOL/L (ref 4–13)
APTT PPP: 31 SECONDS (ref 23–37)
ATRIAL RATE: 48 BPM
BASOPHILS # BLD AUTO: 0.05 THOUSANDS/ΜL (ref 0–0.1)
BASOPHILS NFR BLD AUTO: 1 % (ref 0–1)
BUN SERPL-MCNC: 19 MG/DL (ref 5–25)
CALCIUM SERPL-MCNC: 9.2 MG/DL (ref 8.3–10.1)
CHLORIDE SERPL-SCNC: 103 MMOL/L (ref 100–108)
CO2 SERPL-SCNC: 32 MMOL/L (ref 21–32)
CREAT SERPL-MCNC: 1.5 MG/DL (ref 0.6–1.3)
EOSINOPHIL # BLD AUTO: 0.74 THOUSAND/ΜL (ref 0–0.61)
EOSINOPHIL NFR BLD AUTO: 9 % (ref 0–6)
ERYTHROCYTE [DISTWIDTH] IN BLOOD BY AUTOMATED COUNT: 13.3 % (ref 11.6–15.1)
GFR SERPL CREATININE-BSD FRML MDRD: 48 ML/MIN/1.73SQ M
GLUCOSE SERPL-MCNC: 138 MG/DL (ref 65–140)
HCT VFR BLD AUTO: 48.7 % (ref 36.5–49.3)
HGB BLD-MCNC: 16.1 G/DL (ref 12–17)
IMM GRANULOCYTES # BLD AUTO: 0.03 THOUSAND/UL (ref 0–0.2)
IMM GRANULOCYTES NFR BLD AUTO: 0 % (ref 0–2)
INR PPP: 0.99 (ref 0.84–1.19)
LYMPHOCYTES # BLD AUTO: 2.18 THOUSANDS/ΜL (ref 0.6–4.47)
LYMPHOCYTES NFR BLD AUTO: 27 % (ref 14–44)
MAGNESIUM SERPL-MCNC: 2 MG/DL (ref 1.6–2.6)
MCH RBC QN AUTO: 30 PG (ref 26.8–34.3)
MCHC RBC AUTO-ENTMCNC: 33.1 G/DL (ref 31.4–37.4)
MCV RBC AUTO: 91 FL (ref 82–98)
MONOCYTES # BLD AUTO: 0.76 THOUSAND/ΜL (ref 0.17–1.22)
MONOCYTES NFR BLD AUTO: 10 % (ref 4–12)
NEUTROPHILS # BLD AUTO: 4.2 THOUSANDS/ΜL (ref 1.85–7.62)
NEUTS SEG NFR BLD AUTO: 53 % (ref 43–75)
NRBC BLD AUTO-RTO: 0 /100 WBCS
NT-PROBNP SERPL-MCNC: 231 PG/ML
P AXIS: 71 DEGREES
PLATELET # BLD AUTO: 231 THOUSANDS/UL (ref 149–390)
PMV BLD AUTO: 9.1 FL (ref 8.9–12.7)
POTASSIUM SERPL-SCNC: 4.1 MMOL/L (ref 3.5–5.3)
PR INTERVAL: 174 MS
PROTHROMBIN TIME: 12.9 SECONDS (ref 11.6–14.5)
QRS AXIS: 57 DEGREES
QRSD INTERVAL: 116 MS
QT INTERVAL: 478 MS
QTC INTERVAL: 427 MS
RBC # BLD AUTO: 5.36 MILLION/UL (ref 3.88–5.62)
SODIUM SERPL-SCNC: 140 MMOL/L (ref 136–145)
T WAVE AXIS: 66 DEGREES
TROPONIN I SERPL-MCNC: <0.02 NG/ML
TSH SERPL DL<=0.05 MIU/L-ACNC: 0.79 UIU/ML (ref 0.36–3.74)
VENTRICULAR RATE: 48 BPM
WBC # BLD AUTO: 7.96 THOUSAND/UL (ref 4.31–10.16)

## 2020-09-05 PROCEDURE — 84484 ASSAY OF TROPONIN QUANT: CPT | Performed by: EMERGENCY MEDICINE

## 2020-09-05 PROCEDURE — 83880 ASSAY OF NATRIURETIC PEPTIDE: CPT | Performed by: EMERGENCY MEDICINE

## 2020-09-05 PROCEDURE — 99220 PR INITIAL OBSERVATION CARE/DAY 70 MINUTES: CPT | Performed by: INTERNAL MEDICINE

## 2020-09-05 PROCEDURE — 93010 ELECTROCARDIOGRAM REPORT: CPT | Performed by: INTERNAL MEDICINE

## 2020-09-05 PROCEDURE — 93005 ELECTROCARDIOGRAM TRACING: CPT

## 2020-09-05 PROCEDURE — 85730 THROMBOPLASTIN TIME PARTIAL: CPT | Performed by: EMERGENCY MEDICINE

## 2020-09-05 PROCEDURE — 85025 COMPLETE CBC W/AUTO DIFF WBC: CPT | Performed by: EMERGENCY MEDICINE

## 2020-09-05 PROCEDURE — 84443 ASSAY THYROID STIM HORMONE: CPT | Performed by: EMERGENCY MEDICINE

## 2020-09-05 PROCEDURE — 80048 BASIC METABOLIC PNL TOTAL CA: CPT | Performed by: EMERGENCY MEDICINE

## 2020-09-05 PROCEDURE — 83735 ASSAY OF MAGNESIUM: CPT | Performed by: EMERGENCY MEDICINE

## 2020-09-05 PROCEDURE — 85610 PROTHROMBIN TIME: CPT | Performed by: EMERGENCY MEDICINE

## 2020-09-05 PROCEDURE — 94660 CPAP INITIATION&MGMT: CPT

## 2020-09-05 PROCEDURE — 99285 EMERGENCY DEPT VISIT HI MDM: CPT

## 2020-09-05 PROCEDURE — 36415 COLL VENOUS BLD VENIPUNCTURE: CPT | Performed by: EMERGENCY MEDICINE

## 2020-09-05 PROCEDURE — 99285 EMERGENCY DEPT VISIT HI MDM: CPT | Performed by: EMERGENCY MEDICINE

## 2020-09-05 PROCEDURE — 71045 X-RAY EXAM CHEST 1 VIEW: CPT

## 2020-09-05 PROCEDURE — 1124F ACP DISCUSS-NO DSCNMKR DOCD: CPT | Performed by: EMERGENCY MEDICINE

## 2020-09-05 PROCEDURE — 84484 ASSAY OF TROPONIN QUANT: CPT | Performed by: PHYSICIAN ASSISTANT

## 2020-09-05 RX ORDER — BUPROPION HYDROCHLORIDE 100 MG/1
200 TABLET, EXTENDED RELEASE ORAL 2 TIMES DAILY
Status: DISCONTINUED | OUTPATIENT
Start: 2020-09-05 | End: 2020-09-05 | Stop reason: SDUPTHER

## 2020-09-05 RX ORDER — AMITRIPTYLINE HYDROCHLORIDE 50 MG/1
25 TABLET, FILM COATED ORAL DAILY
Status: DISCONTINUED | OUTPATIENT
Start: 2020-09-06 | End: 2020-09-06 | Stop reason: HOSPADM

## 2020-09-05 RX ORDER — ASPIRIN 81 MG/1
81 TABLET ORAL DAILY
Status: DISCONTINUED | OUTPATIENT
Start: 2020-09-06 | End: 2020-09-06 | Stop reason: HOSPADM

## 2020-09-05 RX ORDER — METHOCARBAMOL 500 MG/1
500 TABLET, FILM COATED ORAL EVERY 6 HOURS PRN
Status: DISCONTINUED | OUTPATIENT
Start: 2020-09-05 | End: 2020-09-06 | Stop reason: HOSPADM

## 2020-09-05 RX ORDER — ATORVASTATIN CALCIUM 80 MG/1
80 TABLET, FILM COATED ORAL
Status: DISCONTINUED | OUTPATIENT
Start: 2020-09-05 | End: 2020-09-06 | Stop reason: HOSPADM

## 2020-09-05 RX ORDER — ONDANSETRON 2 MG/ML
4 INJECTION INTRAMUSCULAR; INTRAVENOUS EVERY 6 HOURS PRN
Status: DISCONTINUED | OUTPATIENT
Start: 2020-09-05 | End: 2020-09-06 | Stop reason: HOSPADM

## 2020-09-05 RX ORDER — SODIUM CHLORIDE 9 MG/ML
75 INJECTION, SOLUTION INTRAVENOUS CONTINUOUS
Status: DISCONTINUED | OUTPATIENT
Start: 2020-09-05 | End: 2020-09-06 | Stop reason: HOSPADM

## 2020-09-05 RX ORDER — TAMSULOSIN HYDROCHLORIDE 0.4 MG/1
0.4 CAPSULE ORAL
Status: DISCONTINUED | OUTPATIENT
Start: 2020-09-05 | End: 2020-09-06 | Stop reason: HOSPADM

## 2020-09-05 RX ORDER — LAMOTRIGINE 100 MG/1
100 TABLET ORAL DAILY
Status: DISCONTINUED | OUTPATIENT
Start: 2020-09-06 | End: 2020-09-06 | Stop reason: HOSPADM

## 2020-09-05 RX ORDER — ALPRAZOLAM 0.5 MG/1
0.5 TABLET ORAL EVERY 8 HOURS PRN
Status: DISCONTINUED | OUTPATIENT
Start: 2020-09-05 | End: 2020-09-06 | Stop reason: HOSPADM

## 2020-09-05 RX ORDER — HEPARIN SODIUM 5000 [USP'U]/ML
5000 INJECTION, SOLUTION INTRAVENOUS; SUBCUTANEOUS EVERY 8 HOURS SCHEDULED
Status: DISCONTINUED | OUTPATIENT
Start: 2020-09-05 | End: 2020-09-06 | Stop reason: HOSPADM

## 2020-09-05 RX ORDER — PAROXETINE HYDROCHLORIDE 20 MG/1
10 TABLET, FILM COATED ORAL DAILY
Status: DISCONTINUED | OUTPATIENT
Start: 2020-09-06 | End: 2020-09-06 | Stop reason: HOSPADM

## 2020-09-05 RX ORDER — LISINOPRIL 10 MG/1
10 TABLET ORAL DAILY
Status: DISCONTINUED | OUTPATIENT
Start: 2020-09-06 | End: 2020-09-05

## 2020-09-05 RX ORDER — RISPERIDONE 1 MG/1
0.5 TABLET, FILM COATED ORAL
Status: DISCONTINUED | OUTPATIENT
Start: 2020-09-05 | End: 2020-09-06 | Stop reason: HOSPADM

## 2020-09-05 RX ORDER — BUPROPION HYDROCHLORIDE 75 MG/1
150 TABLET ORAL 3 TIMES DAILY
Status: DISCONTINUED | OUTPATIENT
Start: 2020-09-05 | End: 2020-09-06 | Stop reason: HOSPADM

## 2020-09-05 RX ADMIN — TAMSULOSIN HYDROCHLORIDE 0.4 MG: 0.4 CAPSULE ORAL at 20:27

## 2020-09-05 RX ADMIN — BUPROPION HYDROCHLORIDE 150 MG: 75 TABLET, FILM COATED ORAL at 22:40

## 2020-09-05 RX ADMIN — HEPARIN SODIUM 5000 UNITS: 5000 INJECTION INTRAVENOUS; SUBCUTANEOUS at 22:41

## 2020-09-05 RX ADMIN — SODIUM CHLORIDE 75 ML/HR: 0.9 INJECTION, SOLUTION INTRAVENOUS at 20:29

## 2020-09-05 RX ADMIN — ATORVASTATIN CALCIUM 80 MG: 80 TABLET, FILM COATED ORAL at 20:27

## 2020-09-05 RX ADMIN — RISPERIDONE 0.5 MG: 1 TABLET ORAL at 22:40

## 2020-09-05 NOTE — ASSESSMENT & PLAN NOTE
With chest tightness during near syncopal episode  Initial troponin less than 0 02    Ongoing cardiac workup

## 2020-09-05 NOTE — ASSESSMENT & PLAN NOTE
Home regimen metoprolol succinate 50 mg daily, ramipril 10 mg daily, verapamil 240 mg daily    Hold metoprolol and verapamil in the setting of bradycardia

## 2020-09-05 NOTE — PLAN OF CARE
Problem: Potential for Falls  Goal: Patient will remain free of falls  Description: INTERVENTIONS:  -  [place] fall precautions as indicated by assessment   - Educate patient/family on patient safety including physical limitations  - Instruct patient to call for assistance with activity based on assessment  - Modify environment to reduce risk of injury    Outcome: Progressing

## 2020-09-05 NOTE — ED PROVIDER NOTES
History  Chief Complaint   Patient presents with    Dizziness     Patient reports sudden lightheadedness, checked BP and had low readings at home  Patient also reporting some tightness in the chest past few days  History provided by:  Patient   used: No    Medical Problem - Major   Location:  Lightheaded earlier checking his blood pressure nose in the 70s  Intermittent chest pain with exertion described as pressure without radiation, no nausea or shortness of breath or diaphoresis  Last 20 minutes and then resolves with rest   Severity:  Severe  Onset quality:  Sudden  Duration:  2 days  Timing:  Intermittent  Progression:  Resolved (Currently has no chest pain or dizziness)  Chronicity:  New  Context:  H O coronary calcium on CT  Normal NM stress 11/2019  Takes daily aspirin and 3 antihypertensives  Has been eating and drinking normally  The patient thought was having some chest pain because he was putting in some heavy doors but does not recall any injury  Relieved by:  Rest  Worsened by:  Exertion  Ineffective treatments:  None tried  Associated symptoms: chest pain    Associated symptoms: no abdominal pain, no congestion, no cough, no fever, no headaches, no nausea, no rash, no rhinorrhea, no shortness of breath, no sore throat and no vomiting        Prior to Admission Medications   Prescriptions Last Dose Informant Patient Reported? Taking?    ALPRAZolam (XANAX) 0 5 mg tablet  Self Yes No   Sig: Take 0 5 mg by mouth every 8 (eight) hours as needed   Cholecalciferol (VITAMIN D3) 5000 units CAPS  Self Yes No   Sig: Take 5,000 Units by mouth daily   Ibuprofen 200 MG CAPS  Self Yes No   Sig: Take 400 mg by mouth 2 (two) times a day    Multiple Vitamin (MULTIVITAMIN) tablet  Self Yes No   Sig: Take 1 tablet by mouth daily   PARoxetine (PAXIL) 10 mg tablet  Self Yes No   amitriptyline (ELAVIL) 25 mg tablet  Self Yes No   Sig: Take 25 mg by mouth daily    aspirin 81 MG tablet  Self Yes No   Sig: Take 81 mg by mouth   buPROPion (WELLBUTRIN SR) 200 MG 12 hr tablet  Self Yes No   Sig: Take 200 mg by mouth 2 (two) times a day    lamoTRIgine (LaMICtal) 100 mg tablet  Self Yes No   Sig: Take 100 mg by mouth daily Take 1/2 pill daily   methocarbamol (ROBAXIN) 500 mg tablet  Self Yes No   Sig: Take 500 mg by mouth   metoprolol succinate (TOPROL-XL) 50 mg 24 hr tablet  Self Yes No   Sig: Take 50 mg by mouth   niacin 500 mg tablet  Self Yes No   Sig: Take 500 mg by mouth 2 (two) times a day with meals   ramipril (ALTACE) 10 MG capsule  Self Yes No   risperiDONE (RisperDAL) 0 5 mg tablet  Self Yes No   Sig: Take 0 5 mg by mouth   rosuvastatin (CRESTOR) 40 MG tablet   No No   Sig: TAKE 1 TABLET BY MOUTH EVERY DAY   tadalafil (CIALIS) 5 MG tablet  Self Yes No   Sig: TAKE 1 TABLET BY MOUTH EVERY DAY   tamsulosin (FLOMAX) 0 4 mg  Self Yes No   Sig: Take 0 4 mg by mouth daily with dinner   verapamil (CALAN-SR) 240 mg CR tablet  Self Yes No      Facility-Administered Medications: None       Past Medical History:   Diagnosis Date    Anxiety     Arthritis     Bipolar 1 disorder (HCC)     Depression     Hyperlipidemia     Hypertension     Migraine        Past Surgical History:   Procedure Laterality Date    APPENDECTOMY         Family History   Problem Relation Age of Onset    Hypertension Father     Depression Father     Bipolar disorder Father     Heart disease Mother     Heart disease Maternal Grandmother     Heart disease Maternal Grandfather      I have reviewed and agree with the history as documented  E-Cigarette/Vaping     E-Cigarette/Vaping Substances     Social History     Tobacco Use    Smoking status: Former Smoker     Last attempt to quit: 1980     Years since quittin 0    Smokeless tobacco: Never Used   Substance Use Topics    Alcohol use: Yes    Drug use: Never       Review of Systems   Constitutional: Negative for chills and fever     HENT: Negative for congestion, rhinorrhea and sore throat  Respiratory: Negative for cough, chest tightness and shortness of breath  Cardiovascular: Positive for chest pain  Negative for leg swelling  Gastrointestinal: Negative for abdominal pain, nausea and vomiting  Genitourinary: Negative for difficulty urinating  Musculoskeletal: Negative for back pain  Skin: Negative for rash  Neurological: Positive for light-headedness  Negative for dizziness, weakness, numbness and headaches  All other systems reviewed and are negative  Physical Exam  Physical Exam  Vitals signs and nursing note reviewed  Constitutional:       General: He is not in acute distress  Appearance: Normal appearance  He is well-developed  He is not ill-appearing, toxic-appearing or diaphoretic  HENT:      Head: Normocephalic and atraumatic  Right Ear: Hearing normal  No drainage or swelling  Left Ear: Hearing normal  No drainage or swelling  Eyes:      General: Lids are normal          Right eye: No discharge  Left eye: No discharge  Conjunctiva/sclera: Conjunctivae normal    Neck:      Musculoskeletal: Normal range of motion  Vascular: No JVD  Trachea: Trachea normal    Cardiovascular:      Rate and Rhythm: Regular rhythm  Bradycardia present  Pulses: Normal pulses  Heart sounds: Normal heart sounds  No murmur  No friction rub  No gallop  Pulmonary:      Effort: Pulmonary effort is normal  No respiratory distress  Breath sounds: Normal breath sounds  No stridor  No wheezing or rales  Chest:      Chest wall: No tenderness  Abdominal:      Palpations: Abdomen is soft  Tenderness: There is no abdominal tenderness  There is no guarding or rebound  Musculoskeletal: Normal range of motion  General: No tenderness or deformity  Right lower leg: No edema  Left lower leg: No edema  Skin:     General: Skin is warm and dry  Coloration: Skin is not pale        Findings: No rash    Neurological:      General: No focal deficit present  Mental Status: He is alert  GCS: GCS eye subscore is 4  GCS verbal subscore is 5  GCS motor subscore is 6  Cranial Nerves: No cranial nerve deficit  Sensory: No sensory deficit  Motor: No abnormal muscle tone  Psychiatric:         Mood and Affect: Mood normal          Speech: Speech normal          Behavior: Behavior is cooperative           Vital Signs  ED Triage Vitals [09/05/20 1332]   Temperature Pulse Respirations Blood Pressure SpO2   97 8 °F (36 6 °C) (!) 48 16 137/83 95 %      Temp Source Heart Rate Source Patient Position - Orthostatic VS BP Location FiO2 (%)   Temporal Monitor Lying Left arm --      Pain Score       --           Vitals:    09/05/20 1332   BP: 137/83   Pulse: (!) 48   Patient Position - Orthostatic VS: Lying         Visual Acuity      ED Medications  Medications - No data to display    Diagnostic Studies  Results Reviewed     Procedure Component Value Units Date/Time    Basic metabolic panel [[id number]]  (Abnormal) Collected:  09/05/20 1343    Lab Status:  Final result Specimen:  Blood from Arm, Right Updated:  09/05/20 1439     Sodium 140 mmol/L      Potassium 4 1 mmol/L      Chloride 103 mmol/L      CO2 32 mmol/L      ANION GAP 5 mmol/L      BUN 19 mg/dL      Creatinine 1 50 mg/dL      Glucose 138 mg/dL      Calcium 9 2 mg/dL      eGFR 48 ml/min/1 73sq m     Narrative:       [place] guidelines for Chronic Kidney Disease (CKD):     Stage 1 with normal or high GFR (GFR > 90 mL/min/1 73 square meters)    Stage 2 Mild CKD (GFR = 60-89 mL/min/1 73 square meters)    Stage 3A Moderate CKD (GFR = 45-59 mL/min/1 73 square meters)    Stage 3B Moderate CKD (GFR = 30-44 mL/min/1 73 square meters)    Stage 4 Severe CKD (GFR = 15-29 mL/min/1 73 square meters)    Stage 5 End Stage CKD (GFR <15 mL/min/1 73 square meters)  Note: GFR calculation is accurate only with a steady state creatinine    Magnesium [[id number]]  (Normal) Collected:  09/05/20 1343    Lab Status:  Final result Specimen:  Blood from Arm, Right Updated:  09/05/20 1439     Magnesium 2 0 mg/dL     NT-BNP PRO [[id number]]  (Abnormal) Collected:  09/05/20 1343    Lab Status:  Final result Specimen:  Blood from Arm, Right Updated:  09/05/20 1439     NT-proBNP 231 pg/mL     Troponin I [[id number]]  (Normal) Collected:  09/05/20 1343    Lab Status:  Final result Specimen:  Blood from Arm, Right Updated:  09/05/20 1429     Troponin I <0 02 ng/mL     Protime-INR [[id number]]  (Normal) Collected:  09/05/20 1343    Lab Status:  Final result Specimen:  Blood from Arm, Right Updated:  09/05/20 1413     Protime 12 9 seconds      INR 0 99    APTT [[id number]]  (Normal) Collected:  09/05/20 1343    Lab Status:  Final result Specimen:  Blood from Arm, Right Updated:  09/05/20 1413     PTT 31 seconds     CBC and differential [[id number]]  (Abnormal) Collected:  09/05/20 1343    Lab Status:  Final result Specimen:  Blood from Arm, Right Updated:  09/05/20 1350     WBC 7 96 Thousand/uL      RBC 5 36 Million/uL      Hemoglobin 16 1 g/dL      Hematocrit 48 7 %      MCV 91 fL      MCH 30 0 pg      MCHC 33 1 g/dL      RDW 13 3 %      MPV 9 1 fL      Platelets 047 Thousands/uL      nRBC 0 /100 WBCs      Neutrophils Relative 53 %      Immat GRANS % 0 %      Lymphocytes Relative 27 %      Monocytes Relative 10 %      Eosinophils Relative 9 %      Basophils Relative 1 %      Neutrophils Absolute 4 20 Thousands/µL      Immature Grans Absolute 0 03 Thousand/uL      Lymphocytes Absolute 2 18 Thousands/µL      Monocytes Absolute 0 76 Thousand/µL      Eosinophils Absolute 0 74 Thousand/µL      Basophils Absolute 0 05 Thousands/µL     TSH [[id number]] Collected:  09/05/20 1343    Lab Status:   In process Specimen:  Blood from Arm, Right Updated:  09/05/20 1347                 XR chest 1 view portable   ED Interpretation by Doroteo Parikh MD (09/05 1430) I have personally reviewed the x-ray and my findings are: no acute disease  Final Result by Jefferson Brito MD (09/05 1416)      No acute cardiopulmonary disease  Workstation performed: [id number]                    Procedures  ECG 12 Lead Documentation Only    Date/Time: 9/5/2020 1:45 PM  Performed by: Anitra Cabello MD  Authorized by: Anitra Cabello MD     Indications / Diagnosis:  Dizziness and cp  ECG reviewed by me, the ED Provider: yes    Patient location:  ED  Interpretation:     Interpretation: abnormal    Rate:     ECG rate:  47    ECG rate assessment: bradycardic    Rhythm:     Rhythm: junctional    QRS:     QRS axis:  Normal    QRS intervals:  Normal  Conduction:     Conduction: normal    ST segments:     ST segments:  Normal  T waves:     T waves: normal               ED Course             HEART Risk Score      Most Recent Value   Heart Score Risk Calculator   History  1 Filed at: 09/05/2020 1430   ECG  0 [Junctional rhythm] Filed at: 09/05/2020 1430   Age  2 Filed at: 09/05/2020 1430   Risk Factors  2 Filed at: 09/05/2020 1430   Troponin  0 Filed at: 09/05/2020 1430   HEART Score  5 Filed at: 09/05/2020 1430                                      MDM  Number of Diagnoses or Management Options  Bradycardia:   Chest pain:   Renal insufficiency:   Diagnosis management comments: Junctional bradycardia may be medication related  No acute ischemic changes on the EKG  Troponins negative  Currently pain free  Blood pressure normal   Case was discussed with cardiology will see the patient in consult but did agree with admission  Case was discussed with [facility] and they agree for admission  Patient is already taking his aspirin today         Amount and/or Complexity of Data Reviewed  Clinical lab tests: ordered and reviewed  Tests in the radiology section of CPT®: ordered and reviewed  Tests in the medicine section of CPT®: ordered and reviewed  Review and summarize past medical records: yes  Discuss the patient with other providers: yes  Independent visualization of images, tracings, or specimens: yes    Patient Progress  Patient progress: stable        Disposition  Final diagnoses:   Bradycardia   Chest pain   Renal insufficiency     Time reflects when diagnosis was documented in both MDM as applicable and the Disposition within this note     Time User Action Codes Description Comment    9/5/2020  1:43 PM Harini Monks Add [R00 1] Bradycardia     9/5/2020  2:37 PM Harini Monks Add [R07 9] Chest pain     9/5/2020  2:44 PM Harini Monks Add [N28 9] Renal insufficiency       ED Disposition     ED Disposition Condition Date/Time Comment    Admit Stable Sat Sep 5, 2020  3:02 PM Case was discussed with HOWIE and the patient's admission status was agreed to be Admission Status: observation status to the service of Dr Herman Juarez   Follow-up Information    None         Patient's Medications   Discharge Prescriptions    No medications on file     No discharge procedures on file      PDMP Review     None          ED Provider  Electronically Signed by           Shelli Cerna MD  09/05/20 2561

## 2020-09-05 NOTE — ASSESSMENT & PLAN NOTE
Patient was outside working on his bike  Went inside and broke out into a sweat  Felt extremely lightheaded/dizzy like he was going to pass out  Blood pressure at that time 100/68, about half hour later 74/59  Associated chest tightness during episode  History of known coronary artery calcifications  EKG noted to be bradycardic at 48 on arrival   Will hold metoprolol and verapamil secondary to bradycardia  Initial troponin less than 0 02  Trend x3  Obtain echocardiogram  Monitor on telemetry    ED spoke with cardiology who will see the patient in consultation

## 2020-09-05 NOTE — ASSESSMENT & PLAN NOTE
History of migraines  States he takes ibuprofen twice a day for his headaches and arthritis  Advised again consistent use of NSAIDs    Consider neurology evaluation as an outpatient for migraine prophylaxis

## 2020-09-05 NOTE — ASSESSMENT & PLAN NOTE
Suspected chronic kidney disease with possible BRAVO  Presenting with creatinine of 1 50 with GFR 48  Unclear baseline as patient has no recent creatinine    Prior records are BMPs from [address] in 2016  Held ramipril on admission  Received IV fluids  Okay to resume at discharge  Followup CMP in 2 weeks

## 2020-09-05 NOTE — H&P
H&P- Yanira Sommers 1955, 76 y o  male MRN: [id number]    Unit/Bed#: E4 -01 Encounter: [id number]    Primary Care Provider: Presley Zeng DO   Date and time admitted to hospital: 9/5/2020  1:20 PM        * Near syncope  Assessment & Plan  Patient was outside working on his bike  Went inside and broke out into a sweat  Felt extremely lightheaded/dizzy like he was going to pass out  Blood pressure at that time 100/68, about half hour later 74/59  Associated chest tightness during episode  History of known coronary artery calcifications  EKG noted to be bradycardic at 48 on arrival   Will hold metoprolol and verapamil secondary to bradycardia  Initial troponin less than 0 02  Trend x3  Obtain echocardiogram  Monitor on telemetry  ED spoke with cardiology who will see the patient in consultation    Chest tightness  Assessment & Plan  With chest tightness during near syncopal episode  Initial troponin less than 0 02  Ongoing cardiac workup    Stage 3 chronic kidney disease ([address] )  Assessment & Plan  Suspected chronic kidney disease with possible BRAVO  Presenting with creatinine of 1 50 with GFR 48  Unclear baseline as patient has no recent creatinine  Prior records are BMPs from [address] in 2016  Hold ramipril  Started on IV fluids  Will monitor closely    Hypertension  Assessment & Plan  Home regimen metoprolol succinate 50 mg daily, ramipril 10 mg daily, verapamil 240 mg daily    Hold metoprolol and verapamil in the setting of bradycardia    Hyperlipidemia  Assessment & Plan  Continue statin    Bipolar 1 disorder (HCC)  Assessment & Plan  On mood stablizers - Risperadol, Lamotrigine, Wellbutrin, Paxil, Elavil, Xanax    Coronary artery calcification seen on CT scan  Assessment & Plan  Visualized via CT imagining  Maintained on statin     Intractable migraine with aura with status migrainosus  Assessment & Plan  History of migraines  States he takes ibuprofen twice a day for his headaches and arthritis  Advised again consistent use of NSAIDs  Consider neurology evaluation as an outpatient for migraine prophylaxis    JAMIE (obstructive sleep apnea)  Assessment & Plan  Continue CPAP      VTE Prophylaxis: Heparin  / sequential compression device   Code Status:  Full code  Anticipated Length of Stay:  Patient will be admitted on an Observation basis with an anticipated length of stay of  less than 2 midnights  Justification for Hospital Stay:  Near syncope with need for further workup evaluation by Cardiology    Chief Complaint:   Near syncope / chest tightness    History of Present Illness:    Wilmer Mayorga is a 76 y o  male with past medical history of bipolar disorder, anxiety, depression, essential hypertension, coronary artery calcifications visualized on CT, hyperlipidemia, arthritis, JAMIE compliant with CPAP  Patient following with neurology and cardiology as an outpatient for episodic altered sensorium with presyncope  He underwent a nuclear stress test November 2019 which was negative for acute ischemia  He is also had a CT chest which showed coronary artery calcifications and was started on received a statin  He presents today with the complaint of almost passing out " Patient was washing his motorcycle outside when he started to feel sick  He went inside his home and broke out into a sweat while sitting down  He then felt like he was going to pass out and became extremely lightheaded/dizzy  He took his blood pressure and noted initial BP to be 100/60  He decided to lay down on the floor  He then checked his blood pressure 1/2 hour later and found it to be 74/59  At that time his pulse was around 50  Patient notes this is quite low for him as he normally is in the 60s to 76s  He also had some associated chest tightness during the episode  Denies any shortness of breath  Patient was alone when episode happen    His wife came home later and recommended he come to the hospital for evaluation  Recent heavy lifting of 180 lb doors that he was found taking  Initially thought chest tightness was from this but he is nontender on palpation  Does not use any assistive devices ambulate  Denies smoking  Will drink a beer daily  Occasional additional alcohol consumption of Tequila shots on the weekends  Has gone days without drinking  Denies history of alcohol withdrawal   Denies drug use  Review of Systems:    General:   No Fever or chills;   EENT:   No ear pain, facial swelling; No sneezing, sore throat  Skin:   No rashes, color changes  Respiratory:     No shortness of breath, cough, wheezing, stridor  Cardiovascular:     No chest pain, palpitations  Gastrointestinal:    No nausea, vomiting, diarrhea; No abdominal pain  Musculoskeletal:     No arthralgias, myalgias, swelling  Neurologic:   No dizziness, numbness, weakness  No speech difficulties     Psych:   No agitation,     Otherwise, All other twelve-point review of systems normal      Past Medical and Surgical History:     Past Medical History:   Diagnosis Date    Anxiety     Arthritis     Bipolar 1 disorder ([facility] [address] )     Depression     Hyperlipidemia     Hypertension     Migraine        Past Surgical History:   Procedure Laterality Date    APPENDECTOMY  1990       Meds/Allergies:    Current Facility-Administered Medications   Medication Dose Route Frequency Provider Last Rate Last Dose    ALPRAZolam (XANAX) tablet 0 5 mg  0 5 mg Oral Q8H PRN Andres Romero PA-C        [START ON 9/6/2020] amitriptyline (ELAVIL) tablet 25 mg  25 mg Oral Daily Stacey Godfrey PA-C        [START ON 9/6/2020] aspirin (ECOTRIN LOW STRENGTH) EC tablet 81 mg  81 mg Oral Daily Stacey Godfrey PA-C        atorvastatin (LIPITOR) tablet 80 mg  80 mg Oral Daily With [employer]OTTY        buPROPion [facility]) 12 hr tablet 200 mg  200 mg Oral BID Stacey Godfrey PA-C        heparin (porcine) subcutaneous injection 5,000 Units  5,000 Units Subcutaneous [facility] Stacey Godfrey PA-C        [START ON 2020] lamoTRIgine (LaMICtal) tablet 100 mg  100 mg Oral Daily Stacey Godfrey PA-C        methocarbamol (ROBAXIN) tablet 500 mg  500 mg Oral Q6H PRN SCOTTY Mcmillan        ondansetron [facility]) injection 4 mg  4 mg Intravenous Q6H PRN SCOTTY Mcmillan        [START ON 2020] PARoxetine (PAXIL) tablet 10 mg  10 mg Oral Daily Stacey Godfrey PA-C        risperiDONE (RisperDAL) tablet 0 5 mg  0 5 mg Oral HS Stacey Godfrey PA-C        sodium chloride 0 9 % infusion  75 mL/hr Intravenous Continuous Stacey Godfrey PA-C        tamsulosin (FLOMAX) capsule 0 4 mg  0 4 mg Oral Daily With [employer]OTTY           Allergies   Allergen Reactions    Prednisone Anaphylaxis     Other reaction(s): Other (See Comments)    Ketorolac Other (See Comments)     rage       Allergies: Allergies   Allergen Reactions    Prednisone Anaphylaxis     Other reaction(s): Other (See Comments)    Ketorolac Other (See Comments)     rage       Social History:     Marital Status: Unknown     Substance Use History:   Social History     Substance and Sexual Activity   Alcohol Use Yes     Social History     Tobacco Use   Smoking Status Former Smoker    Last attempt to quit: 1980    Years since quittin 0   Smokeless Tobacco Never Used     Social History     Substance and Sexual Activity   Drug Use Never       Family History:    non-contributory    Physical Exam:     Vitals:   Blood Pressure: 140/89 (20)  Pulse: 63 (20)  Temperature: 97 5 °F (36 4 °C) (20)  Temp Source: Temporal (20)  Respirations: 17 (20)  Height: 6' 3" (190 5 cm) (20)  Weight - Scale: 100 kg (221 lb 5 5 oz) (20)  SpO2: 96 % (20)    Physical Exam  Vitals signs and nursing note reviewed  Constitutional:       General: He is not in acute distress  Appearance: Normal appearance  He is normal weight  He is not ill-appearing, toxic-appearing or diaphoretic  HENT:      Head: Normocephalic and atraumatic  Nose: No congestion or rhinorrhea  Eyes:      General: No scleral icterus  Cardiovascular:      Rate and Rhythm: Regular rhythm  Bradycardia present  Pulmonary:      Effort: Pulmonary effort is normal  No respiratory distress  Breath sounds: Normal breath sounds  No stridor  No wheezing or rhonchi  Abdominal:      General: Bowel sounds are normal  There is no distension  Palpations: Abdomen is soft  There is no mass  Tenderness: There is no abdominal tenderness  Hernia: No hernia is present  Neurological:      General: No focal deficit present  Mental Status: He is oriented to person, place, and time  Mental status is at baseline  Psychiatric:         Mood and Affect: Mood normal          Behavior: Behavior normal          Additional Data:     Lab Results: I have personally reviewed pertinent reports  Results from last 7 days   Lab Units 09/05/20  1343   WBC Thousand/uL 7 96   HEMOGLOBIN g/dL 16 1   HEMATOCRIT % 48 7   PLATELETS Thousands/uL 231   NEUTROS PCT % 53   LYMPHS PCT % 27   MONOS PCT % 10   EOS PCT % 9*     Results from last 7 days   Lab Units 09/05/20  1343   POTASSIUM mmol/L 4 1   CHLORIDE mmol/L 103   CO2 mmol/L 32   BUN mg/dL 19   CREATININE mg/dL 1 50*   CALCIUM mg/dL 9 2     Results from last 7 days   Lab Units 09/05/20  1343   INR  0 99       Imaging: I have personally reviewed pertinent reports  Xr Chest 1 View Portable    Result Date: 9/5/2020  Narrative: CHEST INDICATION:   cp, dizziness  COMPARISON:  None EXAM PERFORMED/VIEWS:  XR CHEST PORTABLE FINDINGS: Cardiomediastinal silhouette appears unremarkable  The lungs are clear  No pneumothorax or pleural effusion  Osseous structures appear within normal limits for patient age  Impression: No acute cardiopulmonary disease   Workstation performed: [id number]       EKG, Pathology, and Other Studies Reviewed on Admission:   · EK bradycardia    Allscripts Records Reviewed: Yes     Total Time for Visit, including Counseling / Coordination of Care: 45 minutes  Greater than 50% of this total time spent on direct patient counseling and coordination of care  ** Please Note: This note has been constructed using a voice recognition system   **

## 2020-09-05 NOTE — ASSESSMENT & PLAN NOTE
Patient was outside working on his bike  Went inside and broke out into a sweat  Felt extremely lightheaded/dizzy like he was going to pass out  Patient reports blood pressure at that time 100/68, about half hour later 74/59  Evaluated by cardiology is back near syncope is a vagal episode triggered by dehydration  Cardiology has decreased for optimal dosage from 240 mg daily to 120 mg daily  Recommendation of keeping beta-blocker or metoprolol succinate 50 mg  Echocardiogram was ordered which showed no evidence of structural abnormality  LVEF 07% and no diastolic dysfunction  Medication list at discharge per cardiology:   -Verapamil  mg daily-NEW (reduced from 240 mg daily)   -Metoprolol XL 50 mg daily-same   -ASA 81 mg daily-same   -Ramipril 10 mg daily-same   -Crestor 40 mg daily-same  Will need Cardiology follow-up with Dr Nikkie Villalta in 1-2 months  Patient was given lab work CMP and fasting lipid panel to obtain prior to f/u appointment

## 2020-09-05 NOTE — ASSESSMENT & PLAN NOTE
Home regimen metoprolol succinate 50 mg daily, ramipril 10 mg daily, verapamil 240 mg daily  Initially held metoprolol and verapamil in the setting of bradycardia  Cardiology decreased verapamil dosage   See above

## 2020-09-05 NOTE — ASSESSMENT & PLAN NOTE
Episode of chest tightness  Suspected more musculoskeletal in the setting of recent heavy lifting  ACS ruled out

## 2020-09-06 ENCOUNTER — APPOINTMENT (OUTPATIENT)
Dept: NON INVASIVE DIAGNOSTICS | Facility: HOSPITAL | Age: 65
End: 2020-09-06
Payer: MEDICARE

## 2020-09-06 VITALS
DIASTOLIC BLOOD PRESSURE: 80 MMHG | BODY MASS INDEX: 27.52 KG/M2 | OXYGEN SATURATION: 96 % | HEIGHT: 75 IN | SYSTOLIC BLOOD PRESSURE: 157 MMHG | TEMPERATURE: 97.7 F | RESPIRATION RATE: 18 BRPM | HEART RATE: 74 BPM | WEIGHT: 221.34 LBS

## 2020-09-06 LAB
ATRIAL RATE: 47 BPM
P AXIS: 74 DEGREES
PR INTERVAL: 120 MS
QRS AXIS: 60 DEGREES
QRSD INTERVAL: 110 MS
QT INTERVAL: 480 MS
QTC INTERVAL: 424 MS
T WAVE AXIS: 71 DEGREES
VENTRICULAR RATE: 47 BPM

## 2020-09-06 PROCEDURE — 93306 TTE W/DOPPLER COMPLETE: CPT | Performed by: INTERNAL MEDICINE

## 2020-09-06 PROCEDURE — 99214 OFFICE O/P EST MOD 30 MIN: CPT | Performed by: INTERNAL MEDICINE

## 2020-09-06 PROCEDURE — 99217 PR OBSERVATION CARE DISCHARGE MANAGEMENT: CPT | Performed by: PHYSICIAN ASSISTANT

## 2020-09-06 PROCEDURE — 93010 ELECTROCARDIOGRAM REPORT: CPT | Performed by: INTERNAL MEDICINE

## 2020-09-06 PROCEDURE — 93306 TTE W/DOPPLER COMPLETE: CPT

## 2020-09-06 PROCEDURE — 94660 CPAP INITIATION&MGMT: CPT

## 2020-09-06 RX ORDER — METOPROLOL SUCCINATE 50 MG/1
50 TABLET, EXTENDED RELEASE ORAL DAILY
Status: DISCONTINUED | OUTPATIENT
Start: 2020-09-06 | End: 2020-09-06 | Stop reason: HOSPADM

## 2020-09-06 RX ORDER — LISINOPRIL 10 MG/1
10 TABLET ORAL DAILY
Status: DISCONTINUED | OUTPATIENT
Start: 2020-09-06 | End: 2020-09-06 | Stop reason: HOSPADM

## 2020-09-06 RX ADMIN — METOPROLOL SUCCINATE 50 MG: 50 TABLET, EXTENDED RELEASE ORAL at 11:31

## 2020-09-06 RX ADMIN — LISINOPRIL 10 MG: 10 TABLET ORAL at 11:31

## 2020-09-06 RX ADMIN — PAROXETINE 10 MG: 20 TABLET, FILM COATED ORAL at 09:55

## 2020-09-06 RX ADMIN — BUPROPION HYDROCHLORIDE 150 MG: 75 TABLET, FILM COATED ORAL at 09:54

## 2020-09-06 RX ADMIN — AMITRIPTYLINE HYDROCHLORIDE 25 MG: 50 TABLET, FILM COATED ORAL at 09:54

## 2020-09-06 RX ADMIN — VERAPAMIL HYDROCHLORIDE 120 MG: 120 TABLET, FILM COATED, EXTENDED RELEASE ORAL at 12:55

## 2020-09-06 RX ADMIN — LAMOTRIGINE 100 MG: 100 TABLET ORAL at 09:54

## 2020-09-06 RX ADMIN — HEPARIN SODIUM 5000 UNITS: 5000 INJECTION INTRAVENOUS; SUBCUTANEOUS at 06:14

## 2020-09-06 RX ADMIN — ASPIRIN 81 MG: 81 TABLET ORAL at 09:54

## 2020-09-06 NOTE — CONSULTS
Consult - Cardiology   Aguila Locke 76 y o  male MRN: [id number]  Unit/Bed#: E4 -01 Encounter: [id number]          Reason For Consult:  Near syncope  Gerhardt Sep atypical chest pain    History Of Present Illness: The patient is a 76 yr old male with a hx of coronary calcifications on CT scan  Gerhardt Sep He did have an episode of syncope last year  He does have known vertebral artery atherosclerosis, HTN and HLP  Gerhardt Sep He was cleaning his motorcycle yesterday in the early PM and developed lightheadedness without nausea, diaphoresis or chest pain  Gerhardt Sep He took his BP which was initially low normal but later dropped into the 94K systolic  His HR on his BP monitor was 48 (usually >60)    His symptoms did pass over time after coming here and getting hydrated  He reported "exertional chest pain" acc to the ER when they spoke to me but this was a misrepresentation since he had chest pain over the last week only when he lifted a 180 lb door  Gerhardt Sep He does not report chest pain or discomfort with normal activities    He denies edema, palpitations or lightheadedness  He denies SOB  Gerhardt Sep He states he was sweating quite a bit prior to his episode but he had drank a lot of water  He denies recent fever, diarrhea or vomiting  He denies any heavy ETOH use        Past Medical History:   Bipolar disorder  Migraine HA disorder  Coronary calcifications on CT scan  Vertebral artery atherosclerosis  Mild CA stenosis  HTN  HLP (baseline  was not checked on crestor since  Appendectomy  JAMIE  ? CKD  Hx SDH  BPH          Allergy:  Allergies   Allergen Reactions    Prednisone Anaphylaxis     Other reaction(s):  Other (See Comments)    Ketorolac Other (See Comments)     rage       Medications:  Verapamil  mg daily  Xanax prn  Elavil  ASA 81 mg daily  wellburtin  Vit D3  Lamictal  Robaxin  Metoprolol XL 50 mg daily  Niacin  BID  Ramipril 10 mg daily  risperdal  Rosuvastatin 40 mg daily  Flomax 0 4 mg daily hs        Family History:  HTN, Bipolar disorder, "heart disease"    Social History:  Quit smoking in 1980  occ ETOH use      ROS:  occ migraine HAs  La Nena Skipper generally no dizziness  No recent syncope  Wt stable, no fatigue, appetite fine  No cough wheezing or SOB  [place] Nena Skipper no chest tightness  No change of bowels  No passage of blood per stool  No constipation or diarrhea  No abd pain  No urinary frequency or dysuria or urgency  No hematuria  Bilateral shoulder pains for some time  No recent back pain  No gait issues  No excessive bruising or skin lesions    Exam:  167/90  70  General: normal, no distress,   Head: Normocephalic, atraumatic  Eyes:  No Icterus  Normal Conjunctiva  Oropharynx: normal-appearing mucosa and no pharyngitis, no exudate  Neck: supple, symmetrical, trachea midline, thyroid: not enlarged, symmetric, no tenderness/mass/nodules, no carotid bruit and no JVD   Heart: RRR, No: murmer, rub or gallop,   Lungs: normal air entry, lungs clear to auscultation and no rales, rhonchi or wheezing  Abdomen: flat, normal findings: no masses palpable, no organomegaly and soft, non-tender  Lower Limbs: {no edema  DT pulses +1 PT pulses +2 bilat  Musculoskeletal:Independent movement of limbs observed, ROM grossly normal, Strength grossly normal ,   Neurologic: time, date, person,normal,grossly normal  No focal motor or sensory findings    Hb 16 1 wbc 7 96 plts 231k  troponins normal  probnp 231  Creat 1 5 BUN 19 K 4 1  TSH and mg normal  xr-NAD  ECG-SB  [place] Skipper otherwise normal  strips do show jx escape from ER        ASSESSMENT AND PLAN:   1  Near syncope    likely due to vagal episode  Suspect he was dehydrated to trigger this  [place] Skipper was bradycardic but with unusual hypotension support vagal mechanism    hydrate as you are    doubt primary bradycardia but will adjust verapamil down to 120 mg daily    continue metoprolol at usual dosage  2  Atypical chest pain    MI ruled out    clearly this was related to lifting 180 lb doors and is of musculoskeletal origin  3   HTN-BP now increased due to holding of all BP meds    resume ramipril, metoprolol at usual dosages and decrease verapamil to 120 ER daily (hesitant to switch to amlodipine with hx of migraines  Reece Ward as stated this is NOT primary bradycardia  4  HLP-now on crestor 40 mg daily    has never had follow up lipids since starting that I can tell    does have mild CA disease, coronary calcifications (negative bert last year0 and vertebral artery atherosclerosis  5  ? CKD-creat 1 5  Reece Ingles was normal in 2016 but 1 54 July 2019    perhaps some volume depletion at this time      Echo ordered    will try to do prior to DC but would not delay discharge into PM for this  Reece Ingchung low suspicion of LV dysfunction or valvular disease  Ok to home on following meds (cardiac)  Verapamil  mg daily-new (reduced from 240 mg daily)  Metoprolol XL 50 mg daily-same  ASA 81 mg daily-same  Ramipril 10 mg daily  Same  crestor 40 mg daily-same    Will arrange FU with Dr Antione Smith in about 1-2 months for check on BP/HR  Patient needs to get FU BMP, FLP, AST  Reece Ward  please provide Rx for him to do in next few weeks      Pedro Pizarro MD

## 2020-09-06 NOTE — DISCHARGE SUMMARY
Discharge- Sigifredo Andrade 1955, 76 y o  male MRN: [id number]    Unit/Bed#: E4 -01 Encounter: [id number]    Primary Care Provider: Ponce Xiong DO   Date and time admitted to hospital: 9/5/2020  1:20 PM       [address] [employer] Date: 9/6/20          * Near syncope  Assessment & Plan  Patient was outside working on his bike  Went inside and broke out into a sweat  Felt extremely lightheaded/dizzy like he was going to pass out  Patient reports blood pressure at that time 100/68, about half hour later 74/59  Evaluated by cardiology is back near syncope is a vagal episode triggered by dehydration  Cardiology has decreased for optimal dosage from 240 mg daily to 120 mg daily  Recommendation of keeping beta-blocker or metoprolol succinate 50 mg  Echocardiogram was ordered which showed no evidence of structural abnormality  LVEF 75% and no diastolic dysfunction  Medication list at discharge per cardiology:   -Verapamil  mg daily-NEW (reduced from 240 mg daily)   -Metoprolol XL 50 mg daily-same   -ASA 81 mg daily-same   -Ramipril 10 mg daily-same   -Crestor 40 mg daily-same  Will need Cardiology follow-up with Dr Laurie Bergman in 1-2 months  Patient was given lab work CMP and fasting lipid panel to obtain prior to f/u appointment  Chest tightness  Assessment & Plan  Episode of chest tightness  Suspected more musculoskeletal in the setting of recent heavy lifting  ACS ruled out    Stage 3 chronic kidney disease ([facility] [address] )  Assessment & Plan  Suspected chronic kidney disease with possible BRAVO  Presenting with creatinine of 1 50 with GFR 48  Unclear baseline as patient has no recent creatinine  [facility] [address] [facility]  Held ramipril on admission  Received IV fluids  Okay to resume at discharge  Followup CMP in 2 weeks    Hypertension  Assessment & Plan  Home regimen metoprolol succinate 50 mg daily, ramipril 10 mg daily, verapamil 240 mg daily    Initially held metoprolol and verapamil in the setting of bradycardia  Cardiology decreased verapamil dosage  See above    Hyperlipidemia  Assessment & Plan  Continue statin    Bipolar 1 disorder (HCC)  Assessment & Plan  On mood stablizers - Risperadol, Lamotrigine, Wellbutrin, Paxil, Elavil, Xanax    Coronary artery calcification seen on CT scan  Assessment & Plan  Visualized via CT imagining  Maintained on statin     Intractable migraine with aura with status migrainosus  Assessment & Plan  History of migraines  States he takes ibuprofen twice a day for his headaches and arthritis  Advised again consistent use of NSAIDs  Consider neurology evaluation as an outpatient for migraine prophylaxis    JAMIE (obstructive sleep apnea)  Assessment & Plan  Continue CPAP    Consultations During Hospital Stay:  · Cardiology- Dr Elvis Sigala    Procedures Performed:   · 9/5/20: CXR: No acute cardiopulmonary disease  · 9/6/20 Echo:   LEFT VENTRICLE:  Systolic function was normal  Ejection fraction was estimated to be 65 %  There were no regional wall motion abnormalities  Wall thickness was mildly increased      LEFT ATRIUM: The atrium was mildly dilated      MITRAL VALVE: There was mild regurgitation      TRICUSPID VALVE: There was mild regurgitation      PULMONIC VALVE: There was mild regurgitation  Significant Findings / Test Results:   · Near syncope- suspected vasovagal episode    Incidental Findings:   · None    Test Results Pending at Discharge (will require follow up): · None     Outpatient Followup Requested:  · Cardiology- Dr Tulio Rueda    Complications:  none    Hospital Course: [place]oacano Coreas is a 76 y o  male patient who originally presented to the hospital on 9/5/2020 due to a near syncopal event  For further details please see above  In conclusion, pt is stable for discharge  Condition at Discharge: stable     Discharge Day Visit / Exam:     Subjective:  Pt feels well  Had echo today which was normal  Understands need for hydration   Will f/u with cardiology    Vitals: Blood Pressure: 157/80 (09/06/20 1130)  Pulse: 74 (09/06/20 1130)  Temperature: 97 7 °F (36 5 °C) (09/06/20 1130)  Temp Source: Temporal (09/06/20 1130)  Respirations: 18 (09/06/20 1130)  Height: 6' 3" (190 5 cm) (09/05/20 1642)  Weight - Scale: 100 kg (221 lb 5 5 oz) (09/05/20 1642)  SpO2: 96 % (09/06/20 1130)     Exam:   Physical Exam  Vitals signs and nursing note reviewed  Constitutional:       General: He is not in acute distress  Appearance: He is not ill-appearing  HENT:      Head: Normocephalic and atraumatic  Eyes:      General: No scleral icterus  Cardiovascular:      Rate and Rhythm: Normal rate and regular rhythm  Pulmonary:      Effort: Pulmonary effort is normal  No respiratory distress  Breath sounds: Normal breath sounds  No stridor  No wheezing or rhonchi  Abdominal:      General: Bowel sounds are normal  There is no distension  Palpations: Abdomen is soft  There is no mass  Tenderness: There is no abdominal tenderness  There is no right CVA tenderness, left CVA tenderness, guarding or rebound  Hernia: No hernia is present  Musculoskeletal:         General: No swelling  Skin:     General: Skin is warm and dry  Neurological:      General: No focal deficit present  Mental Status: He is oriented to person, place, and time  Mental status is at baseline  Psychiatric:         Mood and Affect: Mood normal          Behavior: Behavior normal          Discharge instructions/Information to patient and family:   See after visit summary for information provided to patient and family  Provisions for Follow-Up Care:  See after visit summary for information related to follow-up care and any pertinent home health orders  Planned Readmission: no     Discharge Statement:  I spent 40 minutes discharging the patient  This time was spent on the day of discharge  I had direct contact with the patient on the day of discharge   Greater than 50% of the total time was spent examining patient, answering all patient questions, arranging and discussing plan of care with patient as well as directly providing post-discharge instructions  Additional time then spent on discharge activities  Discharge Medications:  See after visit summary for reconciled discharge medications provided to patient and family        ** Please Note: This note has been constructed using a voice recognition system **

## 2020-09-06 NOTE — PLAN OF CARE
Problem: Potential for Falls  Goal: Patient will remain free of falls  Description: INTERVENTIONS:  -  [place] fall precautions as indicated by assessment   - Educate patient/family on patient safety including physical limitations  - Instruct patient to call for assistance with activity based on assessment  - Modify environment to reduce risk of injury    Outcome: Progressing

## 2020-09-06 NOTE — PLAN OF CARE
Problem: Potential for Falls  Goal: Patient will remain free of falls  Description: INTERVENTIONS:  -  [place] fall precautions as indicated by assessment   - Educate patient/family on patient safety including physical limitations  - Instruct patient to call for assistance with activity based on assessment  - Modify environment to reduce risk of injury    9/5/2020 2059 by Quintin Reid RN  Outcome: Progressing  9/5/2020 2052 by Quintin Reid RN  Outcome: Progressing     Problem: Potential for Falls  Goal: Patient will remain free of falls  Description: INTERVENTIONS:  -  [place] fall precautions as indicated by assessment   - Educate patient/family on patient safety including physical limitations  - Instruct patient to call for assistance with activity based on assessment  - Modify environment to reduce risk of injury    9/5/2020 2059 by Quintin eRid RN  Outcome: Progressing  9/5/2020 2052 by Quintin Reid RN  Outcome: Progressing     Problem: DISCHARGE PLANNING  Goal: Discharge to home or other facility with appropriate resources  Description: INTERVENTIONS:  - Identify barriers to discharge w/patient and caregiver  - Arrange for needed discharge resources and transportation as appropriate  - Identify discharge learning needs (meds, wound care, etc )  - Arrange for interpretive services to assist at discharge as needed  - Refer to Case Management Department for coordinating discharge planning if the patient needs post-hospital services based on physician/advanced practitioner order or complex needs related to functional status, cognitive ability, or social support system  9/5/2020 2059 by Quintin Reid RN  Outcome: Progressing  9/5/2020 2052 by Quintin Reid RN  Outcome: Progressing     Problem: Knowledge Deficit  Goal: Patient/family/caregiver demonstrates understanding of disease process, treatment plan, medications, and discharge instructions  Description: Complete learning assessment and assess knowledge base    Interventions:  - Provide teaching at level of understanding  - Provide teaching via preferred learning methods  9/5/2020 2059 by Barby Christianson RN  Outcome: Progressing  9/5/2020 2052 by Barby Christianson RN  Outcome: Progressing     Problem: CARDIOVASCULAR - ADULT  Goal: Maintains optimal cardiac output and hemodynamic stability  Description: INTERVENTIONS:  - Monitor I/O, vital signs and rhythm  - Monitor for S/S and trends of decreased cardiac output  - Administer and titrate ordered vasoactive medications to optimize hemodynamic stability  - Assess quality of pulses, skin color and temperature  - Assess for signs of decreased coronary artery perfusion  - Instruct patient to report change in severity of symptoms  9/5/2020 2059 by Barby Christianson RN  Outcome: Progressing  9/5/2020 2052 by Barby Christianson RN  Outcome: Progressing  Goal: Absence of cardiac dysrhythmias or at baseline rhythm  Description: INTERVENTIONS:  - Continuous cardiac monitoring, vital signs, obtain 12 lead EKG if ordered  - Administer antiarrhythmic and heart rate control medications as ordered  - Monitor electrolytes and administer replacement therapy as ordered  9/5/2020 2059 by Barby Christianson RN  Outcome: Progressing  9/5/2020 2052 by Barby Christianson RN  Outcome: Progressing     Problem: METABOLIC, FLUID AND ELECTROLYTES - ADULT  Goal: Electrolytes maintained within normal limits  Description: INTERVENTIONS:  - Monitor labs and assess patient for signs and symptoms of electrolyte imbalances  - Administer electrolyte replacement as ordered  - Monitor response to electrolyte replacements, including repeat lab results as appropriate  - Instruct patient on fluid and nutrition as appropriate  9/5/2020 2059 by Barby Christianson RN  Outcome: Progressing  9/5/2020 2052 by Barby Christianson RN  Outcome: Progressing     Problem: HEMATOLOGIC - ADULT  Goal: Maintains hematologic stability  Description: INTERVENTIONS  - Assess for signs and symptoms of bleeding or hemorrhage  - Monitor labs  - Administer supportive blood products/factors as ordered and appropriate  9/5/2020 2059 by Kathy Sheldon RN  Outcome: Progressing  9/5/2020 2052 by Kathy Sheldon, RN  Outcome: Progressing

## 2020-09-06 NOTE — UTILIZATION REVIEW
Initial Clinical Review    Admission: Date/Time/Statement:   Admission Orders (From admission, onward)     Ordered        09/05/20 1503  Place in Observation  Once                   Orders Placed This Encounter   Procedures    Place in Observation     Standing Status:   Standing     Number of Occurrences:   1     Order Specific Question:   Admitting Physician     Answer:   Natalia Mcneill [[id number]]     Order Specific Question:   Level of Care     Answer:   Med Surg [[id number]]     ED Arrival Information     Expected Arrival Acuity Means of Arrival Escorted By Service Admission Type    - 9/5/2020 13:14 Emergent Walk-In Self Hospitalist Emergency    Arrival Complaint    chest tightness         Chief Complaint   Patient presents with    Dizziness     Patient reports sudden lightheadedness, checked BP and had low readings at home  Patient also reporting some tightness in the chest past few days  Assessment/Plan:  76 y o  male presents today with the complaint of almost passing out " Patient was washing his motorcycle outside when he started to feel sick  He went inside his home and broke out into a sweat while sitting down  He then felt like he was going to pass out and became extremely lightheaded/dizzy  He took his blood pressure and noted initial BP to be 100/60  He decided to lay down on the floor  He then checked his blood pressure 1/2 hour later and found it to be 74/59  At that time his pulse was around 50  Patient notes this is quite low for him as he normally is in the 60s to 76s  He also had some associated chest tightness during the episode  Past medical history of bipolar disorder, anxiety, depression, essential hypertension, coronary artery calcifications visualized on CT, hyperlipidemia, arthritis, JAMIE compliant with CPAP  Plan consult cardiology,telemetry,hold beta blockers and supportive care        ED Triage Vitals   Temperature Pulse Respirations Blood Pressure SpO2   09/05/20 1332 09/05/20 1332 09/05/20 1332 09/05/20 1332 09/05/20 1332   97 8 °F (36 6 °C) (!) 48 16 137/83 95 %      Temp Source Heart Rate Source Patient Position - Orthostatic VS BP Location FiO2 (%)   09/05/20 1332 09/05/20 1332 09/05/20 1332 09/05/20 1332 --   Temporal Monitor Lying Left arm       Pain Score       09/05/20 1617       No Pain          Wt Readings from Last 1 Encounters:   09/05/20 100 kg (221 lb 5 5 oz)     Additional Vital Signs:   Date/Time   Temp   Pulse   Resp   BP   MAP (mmHg)   SpO2   O2 Device   Patient Position - Orthostatic VS    09/06/20 0720   97 1 °F (36 2 °C)Abnormal     70   18   167/90      96 %   None (Room air)   Lying    09/05/20 2310   97 6 °F (36 4 °C)   63   17   138/69   95   97 %   None (Room air)   Lying    09/05/20 2000                     None (Room air)       09/05/20 1910   97 5 °F (36 4 °C)   63   17   140/89   107   96 %   None (Room air)   Lying    09/05/20 1642   97 7 °F (36 5 °C)   56   20   144/85   109         Sitting    09/05/20 1545      56   25Abnormal     168/90   122   96 %   None (Room air)   Lying    09/05/20 1445      48Abnormal     32Abnormal     124/74   94   95 %   None (Room air)          Pertinent Labs/Diagnostic Test Results:       Results from last 7 days   Lab Units 09/05/20  1343   WBC Thousand/uL 7 96   HEMOGLOBIN g/dL 16 1   HEMATOCRIT % 48 7   PLATELETS Thousands/uL 231   NEUTROS ABS Thousands/µL 4 20         Results from last 7 days   Lab Units 09/05/20  1343   SODIUM mmol/L 140   POTASSIUM mmol/L 4 1   CHLORIDE mmol/L 103   CO2 mmol/L 32   ANION GAP mmol/L 5   BUN mg/dL 19   CREATININE mg/dL 1 50*   EGFR ml/min/1 73sq m 48   CALCIUM mg/dL 9 2   MAGNESIUM mg/dL 2 0             Results from last 7 days   Lab Units 09/05/20  1343   GLUCOSE RANDOM mg/dL 138       Results from last 7 days   Lab Units 09/05/20  2314 09/05/20  1959 09/05/20  1343   TROPONIN I ng/mL <0 02 <0 02 <0 02         Results from last 7 days   Lab Units 09/05/20  1343   PROTIME seconds 12 9   INR  0 99   PTT seconds 31     Results from last 7 days   Lab Units 09/05/20  1343   TSH 3RD GENERATON uIU/mL 0 791     Results from last 7 days   Lab Units 09/05/20  1343   NT-PRO BNP pg/mL 231*     EKG 09-05-20  Marked sinus bradycardia    CXR 09-05-20  NAD      Past Medical History:   Diagnosis Date    Anxiety     Arthritis     [facility] disorder ([facility])     Depression     Hyperlipidemia     Hypertension     Migraine      Present on Admission:   Coronary artery calcification seen on CT scan   Bipolar 1 disorder ([facility])   Hypertension   Hyperlipidemia   Near syncope   Chest tightness   Intractable migraine with aura with status migrainosus   JAMIE (obstructive sleep apnea)   Stage 3 chronic kidney disease ([facility])      Admitting Diagnosis: Bradycardia [R00 1]  Chest tightness [R07 89]  Chest pain [R07 9]  Renal insufficiency [N28 9]  Age/Sex: 76 y o  male  Admission Orders:  Scheduled Medications:  amitriptyline, 25 mg, Oral, Daily  aspirin, 81 mg, Oral, Daily  atorvastatin, 80 mg, Oral, Daily With Dinner  buPROPion, 150 mg, Oral, TID  heparin (porcine), 5,000 Units, Subcutaneous, Q8H ISA  lamoTRIgine, 100 mg, Oral, Daily  PARoxetine, 10 mg, Oral, Daily  risperiDONE, 0 5 mg, Oral, HS  tamsulosin, 0 4 mg, Oral, Daily With Dinner      Continuous IV Infusions:  sodium chloride, 75 mL/hr, Intravenous, Continuous      PRN Meds:  ALPRAZolam, 0 5 mg, Oral, Q8H PRN  methocarbamol, 500 mg, Oral, Q6H PRN  ondansetron, 4 mg, Intravenous, Q6H PRN        IP CONSULT TO CARDIOLOGY   Telemetry  [facility] [place]  [place]      Network Utilization Review Department  [email or web address]  ATTENTION: Please call with any questions or concerns to [phone] and carefully listen to the prompts so that you are directed to the right person   All voicemails are confidential   Sudie Crigler all requests for admission clinical reviews, approved or denied determinations and any other requests to dedicated fax number below belonging to the campus where the patient is receiving treatment   List of dedicated fax numbers for the Facilities:  [address] [facility] [address] DENIALS (Administrative/Medical Necessity) [phone]   [address] (Maternity/NICU/Pediatrics) [phone]   Kandis Hutchinson [phone]   Donald Ruiz [phone]   Susan Beatty [phone]   Christa Farris [phone]   [address] [place] [address] [phone]   [facility]  [phone]   [address] [place] [address], [address]  [address] [facility] [phone]

## 2020-09-06 NOTE — NURSING NOTE
IV and telemetry removed  AVS reviewed with patient and scripts for blood work to be completed by 9/20 also given to patient  Patient declined w/c  Walking with wife

## 2020-09-08 ENCOUNTER — TELEPHONE (OUTPATIENT)
Dept: CARDIOLOGY CLINIC | Facility: CLINIC | Age: 65
End: 2020-09-08

## 2020-09-08 NOTE — TELEPHONE ENCOUNTER
----- Message from [facility] sent at 9/8/2020 10:23 AM EDT -----  Gavin Meza is completely booked  We can sched it the pt with everett? ??   ----- Message -----  From: Lisa Fulton  Sent: 9/8/2020  10:17 AM EDT  To: [facility]    Can you help with this appt I cant find any appts with Gavin Meza   ----- Message -----  From: Shira Lim MD  Sent: 9/6/2020  10:18 AM EDT  To: Cardiology [facility]    Needs FU with iván juarez -[facility]    in 1-2 months     reason hypertension    post hospital    PG

## 2020-10-07 ENCOUNTER — TELEPHONE (OUTPATIENT)
Dept: CARDIOLOGY CLINIC | Facility: CLINIC | Age: 65
End: 2020-10-07

## 2020-10-07 DIAGNOSIS — I10 BENIGN ESSENTIAL HYPERTENSION: Primary | ICD-10-CM

## 2020-10-07 RX ORDER — AMLODIPINE BESYLATE 10 MG/1
10 TABLET ORAL DAILY
Qty: 90 TABLET | Refills: 3 | Status: SHIPPED | OUTPATIENT
Start: 2020-10-07 | End: 2021-08-22 | Stop reason: SDUPTHER

## 2020-11-10 ENCOUNTER — OFFICE VISIT (OUTPATIENT)
Dept: CARDIOLOGY CLINIC | Facility: CLINIC | Age: 65
End: 2020-11-10
Payer: MEDICARE

## 2020-11-10 VITALS
SYSTOLIC BLOOD PRESSURE: 116 MMHG | HEIGHT: 75 IN | TEMPERATURE: 97.5 F | DIASTOLIC BLOOD PRESSURE: 68 MMHG | BODY MASS INDEX: 28.57 KG/M2 | HEART RATE: 66 BPM | WEIGHT: 229.8 LBS

## 2020-11-10 DIAGNOSIS — I25.10 CORONARY ARTERY CALCIFICATION SEEN ON CT SCAN: Primary | ICD-10-CM

## 2020-11-10 DIAGNOSIS — R74.01 TRANSAMINITIS: ICD-10-CM

## 2020-11-10 DIAGNOSIS — I10 BENIGN ESSENTIAL HYPERTENSION: ICD-10-CM

## 2020-11-10 DIAGNOSIS — G47.33 OSA (OBSTRUCTIVE SLEEP APNEA): ICD-10-CM

## 2020-11-10 DIAGNOSIS — E78.5 DYSLIPIDEMIA: ICD-10-CM

## 2020-11-10 PROCEDURE — 99214 OFFICE O/P EST MOD 30 MIN: CPT | Performed by: INTERNAL MEDICINE

## 2020-11-17 ENCOUNTER — OFFICE VISIT (OUTPATIENT)
Dept: NEUROLOGY | Facility: CLINIC | Age: 65
End: 2020-11-17
Payer: MEDICARE

## 2020-11-17 VITALS
BODY MASS INDEX: 27.98 KG/M2 | HEART RATE: 65 BPM | WEIGHT: 225 LBS | SYSTOLIC BLOOD PRESSURE: 122 MMHG | DIASTOLIC BLOOD PRESSURE: 71 MMHG | HEIGHT: 75 IN | TEMPERATURE: 97 F

## 2020-11-17 DIAGNOSIS — G47.33 OSA (OBSTRUCTIVE SLEEP APNEA): ICD-10-CM

## 2020-11-17 DIAGNOSIS — G43.709 CHRONIC MIGRAINE W/O AURA W/O STATUS MIGRAINOSUS, NOT INTRACTABLE: Primary | ICD-10-CM

## 2020-11-17 PROCEDURE — 99214 OFFICE O/P EST MOD 30 MIN: CPT | Performed by: PSYCHIATRY & NEUROLOGY

## 2020-11-17 RX ORDER — CYPROHEPTADINE HYDROCHLORIDE 4 MG/1
TABLET ORAL
Qty: 60 TABLET | Refills: 3 | Status: SHIPPED | OUTPATIENT
Start: 2020-11-17

## 2021-02-09 NOTE — ED NOTES
Pt provided with water as requested       Paul Veronica RN  09/05/20 3774 Spoke with mom . He has not vomited since yesterday and no new symptoms. Will send excuse to [facility] to return to school tomorrow after checking back with mom this afternoon. If still symptom free after 24 hours can send note to allow back in school.

## 2021-02-11 ENCOUNTER — TELEPHONE (OUTPATIENT)
Dept: NEUROLOGY | Facility: CLINIC | Age: 66
End: 2021-02-11

## 2021-02-11 NOTE — TELEPHONE ENCOUNTER
Called patient to reschedule appt with Dr Brittany Pyle  Patient states he is not interested  When I asked why he stated he does  not want to what Dr Brittany Pyle wants him to do   Patient did not elaborate and did not want to have any further conversation  I did inform the patient that if at anytime he feels he would like to schedule an appt, he is more than welcome to contact our office to be accomodated

## 2021-03-10 DIAGNOSIS — Z23 ENCOUNTER FOR IMMUNIZATION: ICD-10-CM

## 2021-03-16 ENCOUNTER — IMMUNIZATIONS (OUTPATIENT)
Dept: FAMILY MEDICINE CLINIC | Facility: HOSPITAL | Age: 66
End: 2021-03-16

## 2021-03-16 DIAGNOSIS — Z23 ENCOUNTER FOR IMMUNIZATION: Primary | ICD-10-CM

## 2021-03-16 PROCEDURE — 91300 SARS-COV-2 / COVID-19 MRNA VACCINE (PFIZER-BIONTECH) 30 MCG: CPT

## 2021-03-16 PROCEDURE — 0001A SARS-COV-2 / COVID-19 MRNA VACCINE (PFIZER-BIONTECH) 30 MCG: CPT

## 2021-04-06 ENCOUNTER — IMMUNIZATIONS (OUTPATIENT)
Dept: FAMILY MEDICINE CLINIC | Facility: HOSPITAL | Age: 66
End: 2021-04-06

## 2021-04-06 DIAGNOSIS — Z23 ENCOUNTER FOR IMMUNIZATION: Primary | ICD-10-CM

## 2021-04-06 PROCEDURE — 0002A SARS-COV-2 / COVID-19 MRNA VACCINE (PFIZER-BIONTECH) 30 MCG: CPT

## 2021-04-06 PROCEDURE — 91300 SARS-COV-2 / COVID-19 MRNA VACCINE (PFIZER-BIONTECH) 30 MCG: CPT

## 2021-05-06 ENCOUNTER — OFFICE VISIT (OUTPATIENT)
Dept: CARDIOLOGY CLINIC | Facility: CLINIC | Age: 66
End: 2021-05-06
Payer: MEDICARE

## 2021-05-06 VITALS
BODY MASS INDEX: 29.31 KG/M2 | DIASTOLIC BLOOD PRESSURE: 72 MMHG | HEART RATE: 70 BPM | SYSTOLIC BLOOD PRESSURE: 120 MMHG | WEIGHT: 235.7 LBS | HEIGHT: 75 IN

## 2021-05-06 DIAGNOSIS — E78.5 DYSLIPIDEMIA: ICD-10-CM

## 2021-05-06 DIAGNOSIS — G47.33 OSA (OBSTRUCTIVE SLEEP APNEA): ICD-10-CM

## 2021-05-06 DIAGNOSIS — I25.10 CORONARY ARTERY CALCIFICATION SEEN ON CT SCAN: Primary | ICD-10-CM

## 2021-05-06 DIAGNOSIS — I10 BENIGN ESSENTIAL HYPERTENSION: ICD-10-CM

## 2021-05-06 DIAGNOSIS — I65.03 VERTEBRAL ARTERY STENOSIS, BILATERAL: ICD-10-CM

## 2021-05-06 PROCEDURE — 99214 OFFICE O/P EST MOD 30 MIN: CPT | Performed by: INTERNAL MEDICINE

## 2021-05-06 RX ORDER — SERTRALINE HYDROCHLORIDE 100 MG/1
TABLET, FILM COATED ORAL
COMMUNITY
Start: 2021-03-10

## 2021-05-06 RX ORDER — TESTOSTERONE CYPIONATE 200 MG/ML
INJECTION INTRAMUSCULAR
COMMUNITY
Start: 2021-02-20

## 2021-05-06 NOTE — PROGRESS NOTES
Cardiology Follow-up    Alba Hernandez  [id number]  1955  [facility]  [address] [address]  Kemi Vera [address]  Phone#  [phone]  Fax#  [phone]      1  Coronary artery calcification seen on CT scan     2  Benign essential hypertension     3  Dyslipidemia  Lipid Panel With Direct LDL    Comprehensive metabolic panel   4  Vertebral artery stenosis, bilateral     5  JAMIE (obstructive sleep apnea)         Discussion/Summary:  Mr Otto Khan is a 62-year-old gentleman who presents to the office today for routine follow-up  Since his last visit he has been feeling well  He has had no recurrent near-syncopal or syncopal episodes  His blood pressure is well controlled in the office today on his current antihypertensive medication to which no changes were made  A low-salt diet was reinforced  He is compliant with CPAP  His most recent lipids from last year reveal acceptable numbers on his current statin regimen to which no changes were made  I have asked that he have these reassessed prior to his next visit  No further testing is advised  He will follow-up in the office in six months for re-evaluation  History of Present Illness:  Mr Otto Khan is a 62-year-old gentleman with no prior cardiac history presents to the office today for routine follow-up  He remains active as part of his job although does not participate in any formal physical activity  With the activity he is able to perform he denies any exertional chest pain or shortness of breath  He denies signs or symptoms of congestive heart failure including increasing lower extremity edema, paroxysmal nocturnal dyspnea, orthopnea, acute weight gain or increasing abdominal girth  He denies palpitations or symptoms of claudication  He denies any recurrent lightheadedness or syncope  He remains compliant with CPAP on a nightly basis      Patient Active Problem List   Diagnosis    JAMIE (obstructive sleep apnea)    Bipolar affective disorder ([place] [address] )    Closed fracture of multiple ribs of right side    SDH (subdural hematoma) (HCC)    Right frontal lobe punctate hemorrhage (HCC)    Pneumothorax on right    Hypertensive heart disease without heart failure    Intractable migraine with aura with status migrainosus    Coronary artery calcification seen on CT scan    Vertebral artery stenosis, bilateral    Benign essential hypertension    Dyslipidemia    Postural dizziness with presyncope    Neck pain    Bipolar 1 disorder ([place] [address] )    Depression    Hyperlipidemia    Hypertension    Near syncope    Chest tightness    Stage 3 chronic kidney disease ([place] [address] )    Atypical chest pain     Past Medical History:   Diagnosis Date    Anxiety     Arthritis     Bipolar 1 disorder ([place] [address] )     Depression     Hyperlipidemia     Hypertension     Migraine      Social History     Socioeconomic History    Marital status: /Civil Union     Spouse name: Not on file    Number of children: Not on file    Years of education: Not on file    Highest education level: Not on file   Occupational History    Not on file   Social Needs    Financial resource strain: Not on file    Food insecurity     Worry: Not on file     Inability: Not on file    Transportation needs     Medical: Not on file     Non-medical: Not on file   Tobacco Use    Smoking status: Former Smoker     Quit date: 1980     Years since quittin 7    Smokeless tobacco: Never Used   Substance and Sexual Activity    Alcohol use:  Yes    Drug use: Never    Sexual activity: Not on file   Lifestyle    Physical activity     Days per week: Not on file     Minutes per session: Not on file    Stress: Not on file   Relationships    Social connections     Talks on phone: Not on file     Gets together: Not on file     Attends [employer] service: Not on file     Active member of club or organization: Not on file     Attends meetings of clubs or organizations: Not on file     Relationship status: Not on file    Intimate partner violence     Fear of current or ex partner: Not on file     Emotionally abused: Not on file     Physically abused: Not on file     Forced sexual activity: Not on file   Other Topics Concern    Not on file   Social History Narrative    Not on file      Family History   Problem Relation Age of Onset    Hypertension Father     Depression Father     Bipolar disorder Father     Heart disease Mother     Heart disease Maternal Grandmother     Heart disease Maternal Grandfather      Past Surgical History:   Procedure Laterality Date    APPENDECTOMY  1990       Current Outpatient Medications:     ALPRAZolam (XANAX) 0 5 mg tablet, Take 0 5 mg by mouth every 8 (eight) hours as needed, Disp: , Rfl: 5    amitriptyline (ELAVIL) 25 mg tablet, Take 25 mg by mouth daily , Disp: , Rfl:     amLODIPine (NORVASC) 10 mg tablet, Take 1 tablet (10 mg total) by mouth daily, Disp: 90 tablet, Rfl: 3    buPROPion (WELLBUTRIN SR) 200 MG 12 hr tablet, Take 200 mg by mouth 2 (two) times a day , Disp: , Rfl:     lamoTRIgine (LaMICtal) 100 mg tablet, Take 100 mg by mouth daily Take 1/2 pill daily, Disp: , Rfl:     metoprolol succinate (TOPROL-XL) 50 mg 24 hr tablet, Take 50 mg by mouth, Disp: , Rfl:     Multiple Vitamin (MULTIVITAMIN) tablet, Take 1 tablet by mouth daily, Disp: , Rfl:     niacin 500 mg tablet, Take 500 mg by mouth 2 (two) times a day with meals, Disp: , Rfl:     ramipril (ALTACE) 10 MG capsule, , Disp: , Rfl:     risperiDONE (RisperDAL) 0 5 mg tablet, Take 0 5 mg by mouth, Disp: , Rfl:     rosuvastatin (CRESTOR) 40 MG tablet, TAKE 1 TABLET BY MOUTH EVERY DAY, Disp: 90 tablet, Rfl: 3    tamsulosin (FLOMAX) 0 4 mg, Take 0 4 mg by mouth daily with dinner, Disp: , Rfl:     testosterone cypionate (DEPO-TESTOSTERONE) 200 mg/mL SOLN, INJECT 1 ML WEEKLY, Disp: , Rfl:     aspirin 81 MG tablet, Take 81 mg by mouth, Disp: , Rfl:     Cholecalciferol (VITAMIN D3) 5000 units CAPS, Take 5,000 Units by mouth daily, Disp: , Rfl:     cyproheptadine (PERIACTIN) 4 mg tablet, 1 tab HS X 1 week, then increase to 2 tabs HS  (Patient not taking: Reported on 5/6/2021), Disp: 60 tablet, Rfl: 3    PARoxetine (PAXIL) 10 mg tablet, , Disp: , Rfl:     sertraline (ZOLOFT) 100 mg tablet, , Disp: , Rfl:   Allergies   Allergen Reactions    Prednisone Anaphylaxis     Other reaction(s): Other (See Comments)         Labs:  No visits with results within 2 Month(s) from this visit     Latest known visit with results is:   Admission on 09/05/2020, Discharged on 09/06/2020   Component Date Value    WBC 09/05/2020 7 96     RBC 09/05/2020 5 36     Hemoglobin 09/05/2020 16 1     Hematocrit 09/05/2020 48 7     MCV 09/05/2020 91     MCH 09/05/2020 30 0     MCHC 09/05/2020 33 1     RDW 09/05/2020 13 3     MPV 09/05/2020 9 1     Platelets 47/57/7485 231     nRBC 09/05/2020 0     Neutrophils Relative 09/05/2020 53     Immat GRANS % 09/05/2020 0     Lymphocytes Relative 09/05/2020 27     Monocytes Relative 09/05/2020 10     Eosinophils Relative 09/05/2020 9*    Basophils Relative 09/05/2020 1     Neutrophils Absolute 09/05/2020 4 20     Immature Grans Absolute 09/05/2020 0 03     Lymphocytes Absolute 09/05/2020 2 18     Monocytes Absolute 09/05/2020 0 76     Eosinophils Absolute 09/05/2020 0 74*    Basophils Absolute 09/05/2020 0 05     Protime 09/05/2020 12 9     INR 09/05/2020 0 99     PTT 09/05/2020 31     Sodium 09/05/2020 140     Potassium 09/05/2020 4 1     Chloride 09/05/2020 103     CO2 09/05/2020 32     ANION GAP 09/05/2020 5     BUN 09/05/2020 19     Creatinine 09/05/2020 1 50*    Glucose 09/05/2020 138     Calcium 09/05/2020 9 2     eGFR 09/05/2020 48     TSH 3RD GENERATON 09/05/2020 0 791     Magnesium 09/05/2020 2 0     Troponin I 09/05/2020 <0 02     NT-proBNP 09/05/2020 231*    Ventricular Rate 09/05/2020 48     Atrial Rate 09/05/2020 48     HI Interval 09/05/2020 174     QRSD Interval 09/05/2020 116     QT Interval 09/05/2020 478     QTC Interval 09/05/2020 427     P Axis 09/05/2020 71     QRS Axis 09/05/2020 57     T Wave Axis 09/05/2020 66     Troponin I 09/05/2020 <0 02     Troponin I 09/05/2020 <0 02     Ventricular Rate 09/05/2020 47     Atrial Rate 09/05/2020 47     HI Interval 09/05/2020 120     QRSD Interval 09/05/2020 110     QT Interval 09/05/2020 480     QTC Interval 09/05/2020 424     P Axis 09/05/2020 74     QRS Axis 09/05/2020 60     T Wave Axis 09/05/2020 71         Imaging: No results found  Review of Systems:  Review of Systems   Respiratory: Negative for chest tightness and shortness of breath  Musculoskeletal: Positive for arthralgias and back pain  Psychiatric/Behavioral: Positive for dysphoric mood           Vitals:    05/06/21 1017   BP: 120/72   BP Location: Right arm   Patient Position: Sitting   Cuff Size: Standard   Pulse: 70   Weight: 107 kg (235 lb 11 2 oz)   Height: 6' 3" (1 905 m)     Vitals:    05/06/21 1017   Weight: 107 kg (235 lb 11 2 oz)     Height: 6' 3" (190 5 cm)   Physical Exam:  General:  Alert and cooperative, appears stated age  HEENT:  PERRLA, EOMI, no scleral icterus, no conjunctival pallor  Neck:  No lymphadenopathy, no thyromegaly, no carotid bruits, no elevated JVP  Heart:  Regular rate and rhythm, normal S1/S2, no S3/S4, no murmur  Lungs:  Clear to auscultation bilaterally   Abdomen:  Soft, non-tender, positive bowel sounds, no rebound or guarding,   no organomegaly   Extremities:  No clubbing, cyanosis or edema   Vascular:  2+ pedal pulses  Skin:  No rashes or lesions on exposed skin  Neurologic:  Cranial nerves II-XII grossly intact without focal deficits

## 2021-08-21 DIAGNOSIS — I25.10 CORONARY ARTERY CALCIFICATION SEEN ON CT SCAN: ICD-10-CM

## 2021-08-21 RX ORDER — ROSUVASTATIN CALCIUM 40 MG/1
TABLET, COATED ORAL
Qty: 90 TABLET | Refills: 3 | Status: SHIPPED | OUTPATIENT
Start: 2021-08-21 | End: 2021-12-15 | Stop reason: SDUPTHER

## 2021-08-22 DIAGNOSIS — I10 BENIGN ESSENTIAL HYPERTENSION: ICD-10-CM

## 2021-08-22 RX ORDER — AMLODIPINE BESYLATE 10 MG/1
TABLET ORAL
Qty: 90 TABLET | Refills: 3 | Status: SHIPPED | OUTPATIENT
Start: 2021-08-22 | End: 2021-10-08 | Stop reason: SDUPTHER

## 2021-09-15 ENCOUNTER — TELEPHONE (OUTPATIENT)
Dept: CARDIOLOGY CLINIC | Facility: CLINIC | Age: 66
End: 2021-09-15

## 2021-09-21 LAB
ALBUMIN SERPL-MCNC: 4.4 G/DL (ref 3.6–5.1)
ALBUMIN/GLOB SERPL: 1.8 (CALC) (ref 1–2.5)
ALP SERPL-CCNC: 69 U/L (ref 35–144)
ALT SERPL-CCNC: 53 U/L (ref 9–46)
AST SERPL-CCNC: 38 U/L (ref 10–35)
BILIRUB SERPL-MCNC: 0.3 MG/DL (ref 0.2–1.2)
BUN SERPL-MCNC: 15 MG/DL (ref 7–25)
BUN/CREAT SERPL: ABNORMAL (CALC) (ref 6–22)
CALCIUM SERPL-MCNC: 9.6 MG/DL (ref 8.6–10.3)
CHLORIDE SERPL-SCNC: 102 MMOL/L (ref 98–110)
CHOLEST SERPL-MCNC: 146 MG/DL
CHOLEST/HDLC SERPL: 3 (CALC)
CO2 SERPL-SCNC: 32 MMOL/L (ref 20–32)
CREAT SERPL-MCNC: 1.08 MG/DL (ref 0.7–1.25)
GLOBULIN SER CALC-MCNC: 2.5 G/DL (CALC) (ref 1.9–3.7)
GLUCOSE SERPL-MCNC: 95 MG/DL (ref 65–99)
HDLC SERPL-MCNC: 48 MG/DL
LDLC SERPL CALC-MCNC: 75 MG/DL (CALC)
NONHDLC SERPL-MCNC: 98 MG/DL (CALC)
POTASSIUM SERPL-SCNC: 4.1 MMOL/L (ref 3.5–5.3)
PROT SERPL-MCNC: 6.9 G/DL (ref 6.1–8.1)
SL AMB EGFR AFRICAN AMERICAN: 82 ML/MIN/1.73M2
SL AMB EGFR NON AFRICAN AMERICAN: 71 ML/MIN/1.73M2
SODIUM SERPL-SCNC: 140 MMOL/L (ref 135–146)
TRIGL SERPL-MCNC: 148 MG/DL

## 2021-09-24 ENCOUNTER — OFFICE VISIT (OUTPATIENT)
Dept: CARDIOLOGY CLINIC | Facility: CLINIC | Age: 66
End: 2021-09-24
Payer: MEDICARE

## 2021-09-24 VITALS
WEIGHT: 228.5 LBS | HEART RATE: 63 BPM | HEIGHT: 75 IN | BODY MASS INDEX: 28.41 KG/M2 | DIASTOLIC BLOOD PRESSURE: 72 MMHG | SYSTOLIC BLOOD PRESSURE: 110 MMHG

## 2021-09-24 DIAGNOSIS — I25.10 CORONARY ARTERY CALCIFICATION SEEN ON CT SCAN: Primary | ICD-10-CM

## 2021-09-24 DIAGNOSIS — I10 BENIGN ESSENTIAL HYPERTENSION: ICD-10-CM

## 2021-09-24 DIAGNOSIS — E78.5 DYSLIPIDEMIA: ICD-10-CM

## 2021-09-24 DIAGNOSIS — I65.03 VERTEBRAL ARTERY STENOSIS, BILATERAL: ICD-10-CM

## 2021-09-24 DIAGNOSIS — G47.33 OSA (OBSTRUCTIVE SLEEP APNEA): ICD-10-CM

## 2021-09-24 PROCEDURE — 99214 OFFICE O/P EST MOD 30 MIN: CPT | Performed by: INTERNAL MEDICINE

## 2021-09-24 PROCEDURE — 93000 ELECTROCARDIOGRAM COMPLETE: CPT | Performed by: INTERNAL MEDICINE

## 2021-09-24 NOTE — PROGRESS NOTES
Cardiology Follow-up    Iwona Hallman  [id number]  1955  [facility] [place]  [address] [address]  Phone#  [phone]  Fax#  [phone]      1  Coronary artery calcification seen on CT scan  POCT ECG   2  Benign essential hypertension  POCT ECG   3  Dyslipidemia  POCT ECG   4  JAMIE (obstructive sleep apnea)  POCT ECG   5  Vertebral artery stenosis, bilateral  POCT ECG       Discussion/Summary:  Mr Aristeo Mitchell is a 25-year-old gentleman who presents to the office today for routine follow-up  Since his last visit he has been feeling well  He offers no complaints  His blood pressure is adequately controlled in the office today  He checks it at home with acceptable readings  No changes were made to his regimen  A low-salt diet was reinforced  He remains compliant with CPAP  His most recent lipids reveal acceptable numbers on his current statin regimen  He has mildly elevated LFTs improved from prior  These will require ongoing surveillance  No further testing is advised  He will follow-up in the office in six months for re-evaluation  History of Present Illness:  Mr Aristeo Mitchell is a 25-year-old gentleman who presents to the office today for routine follow-up  He continues to remain active as part of his job  With the activity he performs he is asymptomatic  He denies any exertional chest pain or shortness of breath  He denies any signs or symptoms of congestive heart failure including increasing lower extremity edema, paroxysmal nocturnal dyspnea, orthopnea, acute weight gain or increasing abdominal girth  He denies lightheadedness, syncope or presyncope  He denies palpitations  He denies symptoms of claudication    He remains compliant with CPAP on a nightly basis      Patient Active Problem List   Diagnosis    JAMIE (obstructive sleep apnea)    Bipolar affective disorder ([facility] [address] )    Closed fracture of multiple ribs of right side    SDH (subdural hematoma) (HCC)    Right frontal lobe punctate hemorrhage (HCC)    Pneumothorax on right    Hypertensive heart disease without heart failure    Intractable migraine with aura with status migrainosus    Coronary artery calcification seen on CT scan    Vertebral artery stenosis, bilateral    Benign essential hypertension    Dyslipidemia    Postural dizziness with presyncope    Neck pain    Bipolar 1 disorder (HCC)    Depression    Hypertension    Near syncope    Chest tightness    Stage 3 chronic kidney disease (HCC)    Atypical chest pain     Past Medical History:   Diagnosis Date    Anxiety     Arthritis     Bipolar 1 disorder ([facility] [address] )     Depression     Hyperlipidemia     Hypertension     Migraine      Social History     Socioeconomic History    Marital status: /Civil Union     Spouse name: Not on file    Number of children: Not on file    Years of education: Not on file    Highest education level: Not on file   Occupational History    Not on file   Tobacco Use    Smoking status: Former Smoker     Quit date: 1980     Years since quittin 0    Smokeless tobacco: Never Used   Substance and Sexual Activity    Alcohol use: Yes    Drug use: Never    Sexual activity: Not on file   Other Topics Concern    Not on file   Social History Narrative    Not on file     Social Determinants of Health     Financial Resource Strain:     Difficulty of Paying Living Expenses:    Food Insecurity:     Worried About Running Out of Food in the Last Year:     [address] in the Last Year:    Transportation Needs:     Lack of Transportation (Medical):      Lack of Transportation (Non-Medical):    Physical Activity:     Days of Exercise per Week:     Minutes of Exercise per Session:    Stress:     Feeling of Stress :    Social Connections:     Frequency of Communication with Friends and Family:     Frequency of Social Gatherings with Friends and Family:     Attends Muslim Services:     Active Member of Clubs or Organizations:     Attends Club or Organization Meetings:     Marital Status:    Intimate Partner Violence:     Fear of Current or Ex-Partner:     Emotionally Abused:     Physically Abused:     Sexually Abused:       Family History   Problem Relation Age of Onset    Hypertension Father     Depression Father     Bipolar disorder Father     Heart disease Mother     Heart disease Maternal Grandmother     Heart disease Maternal Grandfather      Past Surgical History:   Procedure Laterality Date    APPENDECTOMY  1990       Current Outpatient Medications:     ALPRAZolam (XANAX) 0 5 mg tablet, Take 0 5 mg by mouth every 8 (eight) hours as needed, Disp: , Rfl: 5    amitriptyline (ELAVIL) 25 mg tablet, Take 25 mg by mouth daily , Disp: , Rfl:     amLODIPine (NORVASC) 10 mg tablet, TAKE 1 TABLET BY MOUTH EVERY DAY, Disp: 90 tablet, Rfl: 3    aspirin 81 MG tablet, Take 81 mg by mouth, Disp: , Rfl:     buPROPion (WELLBUTRIN SR) 200 MG 12 hr tablet, Take 200 mg by mouth 2 (two) times a day , Disp: , Rfl:     Cholecalciferol (VITAMIN D3) 5000 units CAPS, Take 5,000 Units by mouth daily, Disp: , Rfl:     lamoTRIgine (LaMICtal) 100 mg tablet, Take 100 mg by mouth daily Take 1/2 pill daily, Disp: , Rfl:     metoprolol succinate (TOPROL-XL) 50 mg 24 hr tablet, Take 50 mg by mouth, Disp: , Rfl:     Multiple Vitamin (MULTIVITAMIN) tablet, Take 1 tablet by mouth daily, Disp: , Rfl:     niacin 500 mg tablet, Take 500 mg by mouth 2 (two) times a day with meals, Disp: , Rfl:     PARoxetine (PAXIL) 10 mg tablet, , Disp: , Rfl:     ramipril (ALTACE) 10 MG capsule, , Disp: , Rfl:     risperiDONE (RisperDAL) 0 5 mg tablet, Take 0 5 mg by mouth, Disp: , Rfl:     rosuvastatin (CRESTOR) 40 MG tablet, TAKE 1 TABLET BY MOUTH EVERY DAY, Disp: 90 tablet, Rfl: 3    tamsulosin (FLOMAX) 0 4 mg, Take 0 4 mg by mouth daily with dinner, Disp: , Rfl:     testosterone cypionate (DEPO-TESTOSTERONE) 200 mg/mL SOLN, Once every 2 weeks, Disp: , Rfl:     cyproheptadine (PERIACTIN) 4 mg tablet, 1 tab HS X 1 week, then increase to 2 tabs HS  (Patient not taking: Reported on 5/6/2021), Disp: 60 tablet, Rfl: 3    sertraline (ZOLOFT) 100 mg tablet, , Disp: , Rfl:   Allergies   Allergen Reactions    Prednisone Anaphylaxis     Other reaction(s): Other (See Comments)         Labs:  Orders Only on 09/21/2021   Component Date Value    Total Cholesterol 09/21/2021 146     HDL 09/21/2021 48     Triglycerides 09/21/2021 148     LDL Calculated 09/21/2021 75     Chol HDLC Ratio 09/21/2021 3 0     Non-HDL Cholesterol 09/21/2021 98     Glucose, Random 09/21/2021 95     BUN 09/21/2021 15     Creatinine 09/21/2021 1 08     eGFR Non  09/21/2021 71     eGFR  09/21/2021 82     SL AMB BUN/CREATININE RA* 69/60/5896 NOT APPLICABLE     Sodium 77/62/1611 140     Potassium 09/21/2021 4 1     Chloride 09/21/2021 102     CO2 09/21/2021 32     Calcium 09/21/2021 9 6     Protein, Total 09/21/2021 6 9     Albumin 09/21/2021 4 4     Globulin 09/21/2021 2 5     Albumin/Globulin Ratio 09/21/2021 1 8     TOTAL BILIRUBIN 09/21/2021 0 3     Alkaline Phosphatase 09/21/2021 69     AST 09/21/2021 38*    ALT 09/21/2021 53*        Imaging: No results found  Review of Systems:  Review of Systems   Musculoskeletal: Positive for arthralgias and back pain  All other systems reviewed and are negative          Vitals:    09/24/21 1408   BP: 110/72   BP Location: Right arm   Patient Position: Sitting   Cuff Size: Large   Pulse: 63   Weight: 104 kg (228 lb 8 oz)   Height: 6' 3" (1 905 m)     Vitals:    09/24/21 1408   Weight: 104 kg (228 lb 8 oz)     Height: 6' 3" (190 5 cm)   Physical Exam:  General:  Alert and cooperative, appears stated age  HEENT:  PERRLA, EOMI, no scleral icterus, no conjunctival pallor  Neck:  No lymphadenopathy, no thyromegaly, no carotid bruits, no elevated JVP  Heart:  Regular rate and rhythm, normal S1/S2, no S3/S4, no murmur  Lungs:  Clear to auscultation bilaterally   Abdomen:  Soft, non-tender, positive bowel sounds, no rebound or guarding,   no organomegaly   Extremities:  No clubbing, cyanosis or edema   Vascular:  2+ pedal pulses  Skin:  No rashes or lesions on exposed skin  Neurologic:  Cranial nerves II-XII grossly intact without focal deficits

## 2021-10-08 DIAGNOSIS — I10 BENIGN ESSENTIAL HYPERTENSION: ICD-10-CM

## 2021-10-08 RX ORDER — AMLODIPINE BESYLATE 10 MG/1
10 TABLET ORAL DAILY
Qty: 90 TABLET | Refills: 3 | Status: SHIPPED | OUTPATIENT
Start: 2021-10-08

## 2021-12-15 DIAGNOSIS — I25.10 CORONARY ARTERY CALCIFICATION SEEN ON CT SCAN: ICD-10-CM

## 2021-12-15 RX ORDER — ROSUVASTATIN CALCIUM 40 MG/1
40 TABLET, COATED ORAL DAILY
Qty: 90 TABLET | Refills: 3 | Status: SHIPPED | OUTPATIENT
Start: 2021-12-15 | End: 2022-03-17 | Stop reason: SDUPTHER

## 2022-03-17 DIAGNOSIS — I25.10 CORONARY ARTERY CALCIFICATION SEEN ON CT SCAN: ICD-10-CM

## 2022-03-17 RX ORDER — ROSUVASTATIN CALCIUM 40 MG/1
40 TABLET, COATED ORAL DAILY
Qty: 90 TABLET | Refills: 3 | Status: SHIPPED | OUTPATIENT
Start: 2022-03-17 | End: 2022-03-23 | Stop reason: SDUPTHER

## 2022-03-23 DIAGNOSIS — I25.10 CORONARY ARTERY CALCIFICATION SEEN ON CT SCAN: ICD-10-CM

## 2022-03-25 RX ORDER — ROSUVASTATIN CALCIUM 40 MG/1
40 TABLET, COATED ORAL DAILY
Qty: 90 TABLET | Refills: 3 | OUTPATIENT
Start: 2022-03-25

## 2022-10-05 NOTE — TELEPHONE ENCOUNTER
----- Message from Lyssa Huizar RN sent at 12/9/2019  5:00 PM EST -----      ----- Message -----  From: Esau Pressley MD  Sent: 12/9/2019   4:53 PM EST  To: [facility]    Please will you be sure that Brittany Hanna knows that his EEG is fine with no clear evidence of seizure so far 
Called patient to make aware of EEG results per Dr Lilian Pond lmom with cb number should patient have any additional questions 
16

## 2022-10-20 DIAGNOSIS — I10 BENIGN ESSENTIAL HYPERTENSION: ICD-10-CM

## 2022-10-21 RX ORDER — AMLODIPINE BESYLATE 10 MG/1
TABLET ORAL
Qty: 90 TABLET | Refills: 0 | Status: SHIPPED | OUTPATIENT
Start: 2022-10-21

## 2023-03-03 DIAGNOSIS — I10 BENIGN ESSENTIAL HYPERTENSION: ICD-10-CM

## 2023-03-03 RX ORDER — AMLODIPINE BESYLATE 10 MG/1
TABLET ORAL
Qty: 90 TABLET | Refills: 0 | Status: SHIPPED | OUTPATIENT
Start: 2023-03-03

## 2024-09-30 DIAGNOSIS — I10 BENIGN ESSENTIAL HYPERTENSION: ICD-10-CM

## 2024-09-30 RX ORDER — AMLODIPINE BESYLATE 10 MG/1
10 TABLET ORAL DAILY
Qty: 90 TABLET | Refills: 3 | Status: SHIPPED | OUTPATIENT
Start: 2024-09-30

## 2024-09-30 NOTE — TELEPHONE ENCOUNTER
Pt contacted Call Center requested refill of their medication.        Doctor Name: Salina Estevez DO       Medication Name: amLODIPine (NORVASC)       Dosage of Med: 10 mg       Frequency of Med: TAKE 1 TABLET BY MOUTH EVERY DAY       Remaining Medication: 0    Pt needs short supply of medication, Mail order pharmacy refill order placed but may take up to 5 business days. Pt has taken last of medication this morning     Pharmacy and Location: [facility] [address] [place] [address] [[id number]]         Pt. Preferred Callback Phone Number: [phone]       Thank you.

## 2024-10-02 NOTE — PROGRESS NOTES
Cardiology Follow-up    Konstantin Cartagena  [id number]  1955  [facility]  [address]  [facility] [address]  [place] [address]  Phone#  [phone]  Fax#  [phone]      1. Benign essential hypertension        2. Hypertensive heart disease without heart failure        3. Dyslipidemia        4. Coronary artery calcification seen on CT scan        5. JAMIE (obstructive sleep apnea)              Discussion/Summary:  Mr. Cartagena is a 69-year-old gentleman who presents to the office today for routine follow-up.  He was last seen in 2021.     He had prolonged chest discomfort without any acute ECG changes.  It resolved without any specific intervention.  It was not typical for pericarditis.  Nonetheless given his risk factors for coronary artery disease and coronary artery calcifications on a CT scan I have asked that he undergo stress echocardiogram for further evaluation.    His blood pressure is adequately controlled in the office today.   No changes were made to his regimen.  A low-salt diet was reinforced.  He remains compliant with CPAP.    I have no recent assessment of his lipids.  A prescription was provided.  For now no changes were made to his statin regimen.    Follow-up will be arranged in one year or sooner if deemed necessary.    History of Present Illness:  Mr. Cartagena is a 69-year-old gentleman who presents to the office today for routine follow-up.  He was last seen in 2021.     A few weeks ago he had chest pain.  He describes constant pressure in his left upper chest for about five days.  He reports it was constant, unrelated to or exacerbated by exertion.  It was not associated with any other symptoms.  It did not radiate.  It was not positional, pleuritic or related to food intake.  His chest wall was not tender to touch.  He reports the pain eventually resolved without any specific intervention but a few days later returned but  was minuscule compared to the first event.  Again it was constant for two days and then resolved spontaneously without any specific intervention.  It has not recurred.    He continues to remain active as part of his job.  He denies any exertional chest pain or shortness of breath.  He denies any signs or symptoms of congestive heart failure including increasing lower extremity edema, paroxysmal nocturnal dyspnea, orthopnea, acute weight gain or increasing abdominal girth.  He denies lightheadedness, syncope or presyncope.  He denies palpitations.  He denies symptoms of claudication    He remains compliant with CPAP on a nightly basis.    Patient Active Problem List   Diagnosis    JAMIE (obstructive sleep apnea)    Bipolar affective disorder ([facility])    Closed fracture of multiple ribs of right side    SDH (subdural hematoma) ([facility])    Right frontal lobe punctate hemorrhage ([facility])    Pneumothorax on right    Hypertensive heart disease without heart failure    Intractable migraine with aura with status migrainosus    Coronary artery calcification seen on CT scan    Vertebral artery stenosis, bilateral    Benign essential hypertension    Dyslipidemia    Postural dizziness with presyncope    Neck pain    Bipolar 1 disorder ([facility])    Depression    Hypertension    Near syncope    Chest tightness    Stage 3 chronic kidney disease ([facility])    Atypical chest pain     Past Medical History:   Diagnosis Date    Anxiety     Arthritis     Bipolar 1 disorder ([facility])     Depression     Hyperlipidemia     Hypertension     Migraine      Social History     Socioeconomic History    Marital status: /Civil Union     Spouse name: Not on file    Number of children: Not on file    Years of education: Not on file    Highest education level: Not on file   Occupational History    Not on file   Tobacco Use    Smoking status: Former     Current packs/day: 0.00     Types: Cigarettes     Quit date: 1980     Years since quittin.1    Smokeless  tobacco: Never   Substance and Sexual Activity    Alcohol use: Yes    Drug use: Never    Sexual activity: Not on file   Other Topics Concern    Not on file   Social History Narrative    Not on file     Social Determinants of Health     Financial Resource Strain: Not on file   Food Insecurity: Not on file   Transportation Needs: Not on file   Physical Activity: Not on file   Stress: Not on file   Social Connections: Not on file   Intimate Partner Violence: Not on file   Housing Stability: Not on file      Family History   Problem Relation Age of Onset    Hypertension Father     Depression Father     Bipolar disorder Father     Heart disease Mother     Heart disease Maternal Grandmother     Heart disease Maternal Grandfather      Past Surgical History:   Procedure Laterality Date    APPENDECTOMY  1990       Current Outpatient Medications:     ALPRAZolam (XANAX) 0.5 mg tablet, Take 0.5 mg by mouth every 8 (eight) hours as needed, Disp: , Rfl: 5    amitriptyline (ELAVIL) 25 mg tablet, Take 25 mg by mouth daily , Disp: , Rfl:     amLODIPine (NORVASC) 10 mg tablet, Take 1 tablet (10 mg total) by mouth daily, Disp: 90 tablet, Rfl: 3    aspirin 81 MG tablet, Take 81 mg by mouth, Disp: , Rfl:     buPROPion (WELLBUTRIN SR) 200 MG 12 hr tablet, Take 200 mg by mouth 2 (two) times a day , Disp: , Rfl:     Cholecalciferol (VITAMIN D3) 5000 units CAPS, Take 5,000 Units by mouth daily, Disp: , Rfl:     cyproheptadine (PERIACTIN) 4 mg tablet, 1 tab HS X 1 week, then increase to 2 tabs HS. (Patient not taking: Reported on 5/6/2021), Disp: 60 tablet, Rfl: 3    lamoTRIgine (LaMICtal) 100 mg tablet, Take 100 mg by mouth daily Take 1/2 pill daily, Disp: , Rfl:     metoprolol succinate (TOPROL-XL) 50 mg 24 hr tablet, Take 50 mg by mouth, Disp: , Rfl:     Multiple Vitamin (MULTIVITAMIN) tablet, Take 1 tablet by mouth daily, Disp: , Rfl:     niacin 500 mg tablet, Take 500 mg by mouth 2 (two) times a day with meals, Disp: , Rfl:      PARoxetine (PAXIL) 10 mg tablet, , Disp: , Rfl:     ramipril (ALTACE) 10 MG capsule, , Disp: , Rfl:     risperiDONE (RisperDAL) 0.5 mg tablet, Take 0.5 mg by mouth, Disp: , Rfl:     rosuvastatin (CRESTOR) 40 MG tablet, Take 1 tablet (40 mg total) by mouth daily, Disp: 90 tablet, Rfl: 3    sertraline (ZOLOFT) 100 mg tablet, , Disp: , Rfl:     tamsulosin (FLOMAX) 0.4 mg, Take 0.4 mg by mouth daily with dinner, Disp: , Rfl:     testosterone cypionate (DEPO-TESTOSTERONE) 200 mg/mL SOLN, Once every 2 weeks, Disp: , Rfl:   Allergies   Allergen Reactions    Prednisone Anaphylaxis     Other reaction(s): Other (See Comments)         Labs:  No visits with results within 2 Month(s) from this visit.   Latest known visit with results is:   Orders Only on 09/21/2021   Component Date Value    Total Cholesterol 09/21/2021 146     HDL 09/21/2021 48     Triglycerides 09/21/2021 148     LDL Calculated 09/21/2021 75     Chol HDLC Ratio 09/21/2021 3.0     Non-HDL Cholesterol 09/21/2021 98     Glucose, Random 09/21/2021 95     BUN 09/21/2021 15     Creatinine 09/21/2021 1.08     eGFR Non  09/21/2021 71     eGFR  09/21/2021 82     SL AMB BUN/CREATININE RA* 09/21/2021 NOT APPLICABLE     Sodium 09/21/2021 140     Potassium 09/21/2021 4.1     Chloride 09/21/2021 102     CO2 09/21/2021 32     Calcium 09/21/2021 9.6     Protein, Total 09/21/2021 6.9     Albumin 09/21/2021 4.4     Globulin 09/21/2021 2.5     Albumin/Globulin Ratio 09/21/2021 1.8     TOTAL BILIRUBIN 09/21/2021 0.3     Alkaline Phosphatase 09/21/2021 69     AST 09/21/2021 38 (H)     ALT 09/21/2021 53 (H)         Imaging: No results found.      Review of Systems:  Review of Systems   Respiratory:  Negative for apnea, chest tightness, shortness of breath and wheezing.    Cardiovascular:  Positive for chest pain. Negative for palpitations and leg swelling.   Musculoskeletal:  Positive for arthralgias and back pain.   All other systems reviewed and are  negative.        There were no vitals filed for this visit.    There were no vitals filed for this visit.        Physical Exam:  General:  Alert and cooperative, appears stated age  HEENT:  PERRLA, EOMI, no scleral icterus, no conjunctival pallor  Neck:  No lymphadenopathy, no thyromegaly, no carotid bruits, no elevated JVP  Heart:  Regular rate and rhythm, normal S1/S2, no S3/S4, no murmur  Lungs:  Clear to auscultation bilaterally   Abdomen:  Soft, non-tender, positive bowel sounds, no rebound or guarding,   no organomegaly   Extremities:  No clubbing, cyanosis or edema   Vascular:  2+ pedal pulses  Skin:  No rashes or lesions on exposed skin  Neurologic:  Cranial nerves II-XII grossly intact without focal deficits

## 2024-10-03 ENCOUNTER — OFFICE VISIT (OUTPATIENT)
Dept: CARDIOLOGY CLINIC | Facility: CLINIC | Age: 69
End: 2024-10-03
Payer: MEDICARE

## 2024-10-03 VITALS
DIASTOLIC BLOOD PRESSURE: 70 MMHG | BODY MASS INDEX: 27.03 KG/M2 | HEART RATE: 55 BPM | SYSTOLIC BLOOD PRESSURE: 124 MMHG | HEIGHT: 76 IN | WEIGHT: 222 LBS

## 2024-10-03 DIAGNOSIS — I25.10 CORONARY ARTERY CALCIFICATION SEEN ON CT SCAN: ICD-10-CM

## 2024-10-03 DIAGNOSIS — I11.9 HYPERTENSIVE HEART DISEASE WITHOUT HEART FAILURE: ICD-10-CM

## 2024-10-03 DIAGNOSIS — E78.5 DYSLIPIDEMIA: ICD-10-CM

## 2024-10-03 DIAGNOSIS — I10 BENIGN ESSENTIAL HYPERTENSION: ICD-10-CM

## 2024-10-03 DIAGNOSIS — R07.89 ATYPICAL CHEST PAIN: Primary | ICD-10-CM

## 2024-10-03 DIAGNOSIS — G47.33 OSA (OBSTRUCTIVE SLEEP APNEA): ICD-10-CM

## 2024-10-03 PROCEDURE — 99214 OFFICE O/P EST MOD 30 MIN: CPT | Performed by: INTERNAL MEDICINE

## 2024-10-03 PROCEDURE — 93000 ELECTROCARDIOGRAM COMPLETE: CPT | Performed by: INTERNAL MEDICINE

## 2024-10-03 RX ORDER — MULTIVIT WITH MINERALS/LUTEIN
TABLET ORAL
COMMUNITY
Start: 2024-09-26

## 2024-10-03 RX ORDER — OMEGA-3 FATTY ACIDS/FISH OIL 300-1000MG
CAPSULE ORAL
COMMUNITY
Start: 2024-09-26

## 2024-10-09 ENCOUNTER — HOSPITAL ENCOUNTER (OUTPATIENT)
Dept: NON INVASIVE DIAGNOSTICS | Facility: HOSPITAL | Age: 69
Discharge: HOME | End: 2024-10-09
Attending: INTERNAL MEDICINE
Payer: MEDICARE

## 2024-10-09 VITALS
HEART RATE: 71 BPM | DIASTOLIC BLOOD PRESSURE: 78 MMHG | SYSTOLIC BLOOD PRESSURE: 150 MMHG | OXYGEN SATURATION: 94 % | BODY MASS INDEX: 27.03 KG/M2 | HEIGHT: 76 IN | WEIGHT: 222 LBS

## 2024-10-09 DIAGNOSIS — I25.10 CORONARY ARTERY CALCIFICATION SEEN ON CT SCAN: ICD-10-CM

## 2024-10-09 DIAGNOSIS — R07.89 ATYPICAL CHEST PAIN: ICD-10-CM

## 2024-10-09 LAB
CHEST PAIN STATEMENT: NORMAL
MAX DIASTOLIC BP: 60 MMHG
MAX PREDICTED HEART RATE: 150 BPM
PROTOCOL NAME: NORMAL
REASON FOR TERMINATION: NORMAL
STRESS POST EXERCISE DUR MIN: 7 MIN
STRESS POST EXERCISE DUR SEC: 22 SEC
STRESS POST PEAK HR: 123 BPM
STRESS POST PEAK SYSTOLIC BP: 180 MMHG
TARGET HR FORMULA: NORMAL
TEST INDICATION: NORMAL

## 2024-10-09 PROCEDURE — 93350 STRESS TTE ONLY: CPT | Performed by: INTERNAL MEDICINE

## 2024-10-09 PROCEDURE — 93350 STRESS TTE ONLY: CPT

## 2024-10-10 LAB
AORTIC ROOT: 2.7 CM
E WAVE DECELERATION TIME: 216 MS
E/A RATIO: 0.99
FRACTIONAL SHORTENING: 32 (ref 28–44)
INTERVENTRICULAR SEPTUM IN DIASTOLE (PARASTERNAL SHORT AXIS VIEW): 1.3 CM
INTERVENTRICULAR SEPTUM: 1.3 CM (ref 0.6–1.1)
LEFT ATRIUM SIZE: 3.9 CM
LEFT INTERNAL DIMENSION IN SYSTOLE: 3 CM (ref 2.1–4)
LEFT VENTRICULAR INTERNAL DIMENSION IN DIASTOLE: 4.4 CM (ref 3.5–6)
LEFT VENTRICULAR POSTERIOR WALL IN END DIASTOLE: 1 CM
LEFT VENTRICULAR STROKE VOLUME: 53 ML
LVSV (TEICH): 53 ML
MAX HR PERCENT: 82 %
MAX HR: 123 BPM
MV E'TISSUE VEL-LAT: 9 CM/S
MV E'TISSUE VEL-SEP: 7 CM/S
MV PEAK A VEL: 1.06 M/S
MV PEAK E VEL: 105 CM/S
MV STENOSIS PRESSURE HALF TIME: 63 MS
MV VALVE AREA P 1/2 METHOD: 3.5
RATE PRESSURE PRODUCT: NORMAL
SL CV LV EF: 60
SL CV PED ECHO LEFT VENTRICLE DIASTOLIC VOLUME (MOD BIPLANE) 2D: 87 ML
SL CV PED ECHO LEFT VENTRICLE SYSTOLIC VOLUME (MOD BIPLANE) 2D: 34 ML
SL CV STRESS RECOVERY BP: NORMAL MMHG
SL CV STRESS RECOVERY HR: 87 BPM
SL CV STRESS RECOVERY O2 SAT: 95 %
STRESS ANGINA INDEX: 0
STRESS BASELINE BP: NORMAL MMHG
STRESS BASELINE HR: 71 BPM
STRESS O2 SAT REST: 94 %
STRESS PEAK HR: 123 BPM
STRESS POST ESTIMATED WORKLOAD: 9 METS
STRESS POST EXERCISE DUR MIN: 7 MIN
STRESS POST EXERCISE DUR SEC: 22 SEC
STRESS POST O2 SAT PEAK: 95 %
STRESS POST PEAK BP: 180 MMHG
TRICUSPID ANNULAR PLANE SYSTOLIC EXCURSION: 3 CM

## 2024-10-23 LAB
ALBUMIN SERPL-MCNC: 4.4 G/DL (ref 3.6–5.1)
ALBUMIN/GLOB SERPL: 1.8 (CALC) (ref 1–2.5)
ALP SERPL-CCNC: 66 U/L (ref 35–144)
ALT SERPL-CCNC: 43 U/L (ref 9–46)
AST SERPL-CCNC: 34 U/L (ref 10–35)
BILIRUB SERPL-MCNC: 0.4 MG/DL (ref 0.2–1.2)
BUN SERPL-MCNC: 22 MG/DL (ref 7–25)
BUN/CREAT SERPL: ABNORMAL (CALC) (ref 6–22)
CALCIUM SERPL-MCNC: 9.8 MG/DL (ref 8.6–10.3)
CHLORIDE SERPL-SCNC: 102 MMOL/L (ref 98–110)
CHOLEST SERPL-MCNC: 133 MG/DL
CHOLEST/HDLC SERPL: 2.1 (CALC)
CO2 SERPL-SCNC: 33 MMOL/L (ref 20–32)
CREAT SERPL-MCNC: 0.99 MG/DL (ref 0.7–1.28)
GFR/BSA.PRED SERPLBLD CYS-BASED-ARV: 82 ML/MIN/1.73M2
GLOBULIN SER CALC-MCNC: 2.5 G/DL (CALC) (ref 1.9–3.7)
GLUCOSE SERPL-MCNC: 94 MG/DL (ref 65–99)
HDLC SERPL-MCNC: 62 MG/DL
LDLC SERPL CALC-MCNC: 54 MG/DL (CALC)
NONHDLC SERPL-MCNC: 71 MG/DL (CALC)
POTASSIUM SERPL-SCNC: 4.1 MMOL/L (ref 3.5–5.3)
PROT SERPL-MCNC: 6.9 G/DL (ref 6.1–8.1)
SODIUM SERPL-SCNC: 141 MMOL/L (ref 135–146)
TRIGL SERPL-MCNC: 86 MG/DL